# Patient Record
Sex: MALE | Race: WHITE | NOT HISPANIC OR LATINO | Employment: FULL TIME | URBAN - METROPOLITAN AREA
[De-identification: names, ages, dates, MRNs, and addresses within clinical notes are randomized per-mention and may not be internally consistent; named-entity substitution may affect disease eponyms.]

---

## 2023-03-24 DIAGNOSIS — E11.9 TYPE 2 DIABETES MELLITUS WITHOUT COMPLICATIONS (MULTI): ICD-10-CM

## 2023-03-24 RX ORDER — EMPAGLIFLOZIN AND METFORMIN HYDROCHLORIDE 12.5; 5 MG/1; MG/1
TABLET ORAL
Qty: 180 TABLET | Refills: 0 | Status: SHIPPED | OUTPATIENT
Start: 2023-03-24 | End: 2023-07-21

## 2023-07-18 DIAGNOSIS — E11.9 TYPE 2 DIABETES MELLITUS WITHOUT COMPLICATIONS (MULTI): ICD-10-CM

## 2023-07-21 RX ORDER — EMPAGLIFLOZIN AND METFORMIN HYDROCHLORIDE 12.5; 5 MG/1; MG/1
1 TABLET ORAL 2 TIMES DAILY
Qty: 60 TABLET | Refills: 0 | Status: SHIPPED | OUTPATIENT
Start: 2023-07-21 | End: 2023-07-26 | Stop reason: SDUPTHER

## 2023-07-21 NOTE — TELEPHONE ENCOUNTER
Pt made an appointment for 7/26/23  Requesting a short supply for Synjardy 12.5-500mg sent to [facility]

## 2023-07-26 ENCOUNTER — OFFICE VISIT (OUTPATIENT)
Dept: PRIMARY CARE | Facility: CLINIC | Age: 53
End: 2023-07-26
Payer: COMMERCIAL

## 2023-07-26 VITALS
HEART RATE: 81 BPM | HEIGHT: 69 IN | SYSTOLIC BLOOD PRESSURE: 114 MMHG | DIASTOLIC BLOOD PRESSURE: 76 MMHG | OXYGEN SATURATION: 95 % | BODY MASS INDEX: 34.96 KG/M2 | TEMPERATURE: 97 F | RESPIRATION RATE: 16 BRPM | WEIGHT: 236 LBS

## 2023-07-26 DIAGNOSIS — Z00.00 ANNUAL PHYSICAL EXAM: Primary | ICD-10-CM

## 2023-07-26 DIAGNOSIS — E66.09 CLASS 1 OBESITY DUE TO EXCESS CALORIES WITHOUT SERIOUS COMORBIDITY WITH BODY MASS INDEX (BMI) OF 34.0 TO 34.9 IN ADULT: ICD-10-CM

## 2023-07-26 DIAGNOSIS — Z12.5 SCREENING PSA (PROSTATE SPECIFIC ANTIGEN): ICD-10-CM

## 2023-07-26 DIAGNOSIS — Z12.11 COLON CANCER SCREENING: ICD-10-CM

## 2023-07-26 DIAGNOSIS — E11.9 TYPE 2 DIABETES MELLITUS WITHOUT COMPLICATIONS (MULTI): ICD-10-CM

## 2023-07-26 DIAGNOSIS — E11.9 TYPE 2 DIABETES MELLITUS WITHOUT COMPLICATION, WITHOUT LONG-TERM CURRENT USE OF INSULIN (MULTI): ICD-10-CM

## 2023-07-26 DIAGNOSIS — E78.5 DYSLIPIDEMIA: ICD-10-CM

## 2023-07-26 PROBLEM — M70.70 BURSITIS OF HIP: Status: ACTIVE | Noted: 2023-07-26

## 2023-07-26 PROBLEM — R52 BODY ACHES: Status: ACTIVE | Noted: 2023-07-26

## 2023-07-26 PROBLEM — R51.9 HEADACHE: Status: ACTIVE | Noted: 2023-07-26

## 2023-07-26 PROBLEM — M76.60 ACHILLES TENDON PAIN: Status: ACTIVE | Noted: 2023-07-26

## 2023-07-26 PROBLEM — R09.89 CHEST CONGESTION: Status: ACTIVE | Noted: 2023-07-26

## 2023-07-26 PROBLEM — K57.32 DIVERTICULITIS, COLON: Status: ACTIVE | Noted: 2023-07-26

## 2023-07-26 PROBLEM — R53.83 FATIGUE: Status: ACTIVE | Noted: 2023-07-26

## 2023-07-26 PROBLEM — N52.9 ERECTILE DYSFUNCTION: Status: ACTIVE | Noted: 2023-07-26

## 2023-07-26 PROBLEM — M25.552 HIP PAIN, LEFT: Status: ACTIVE | Noted: 2023-07-26

## 2023-07-26 PROBLEM — R19.7 DIARRHEA: Status: ACTIVE | Noted: 2023-07-26

## 2023-07-26 PROBLEM — E66.811 CLASS 1 OBESITY DUE TO EXCESS CALORIES WITHOUT SERIOUS COMORBIDITY WITH BODY MASS INDEX (BMI) OF 34.0 TO 34.9 IN ADULT: Status: ACTIVE | Noted: 2023-07-26

## 2023-07-26 PROCEDURE — 3074F SYST BP LT 130 MM HG: CPT | Performed by: FAMILY MEDICINE

## 2023-07-26 PROCEDURE — 99396 PREV VISIT EST AGE 40-64: CPT | Performed by: FAMILY MEDICINE

## 2023-07-26 PROCEDURE — 3078F DIAST BP <80 MM HG: CPT | Performed by: FAMILY MEDICINE

## 2023-07-26 PROCEDURE — 3008F BODY MASS INDEX DOCD: CPT | Performed by: FAMILY MEDICINE

## 2023-07-26 PROCEDURE — 1036F TOBACCO NON-USER: CPT | Performed by: FAMILY MEDICINE

## 2023-07-26 RX ORDER — MULTIVITAMIN
1 TABLET ORAL DAILY
COMMUNITY

## 2023-07-26 RX ORDER — ATORVASTATIN CALCIUM 20 MG/1
20 TABLET, FILM COATED ORAL DAILY
Qty: 90 TABLET | Refills: 1 | Status: SHIPPED | OUTPATIENT
Start: 2023-07-26 | End: 2023-12-13

## 2023-07-26 RX ORDER — ASPIRIN 81 MG/1
1 TABLET ORAL DAILY
COMMUNITY
Start: 2020-02-19

## 2023-07-26 RX ORDER — DULAGLUTIDE 0.75 MG/.5ML
0.75 INJECTION, SOLUTION SUBCUTANEOUS
Qty: 2 ML | Refills: 1 | Status: SHIPPED | OUTPATIENT
Start: 2023-07-26 | End: 2023-08-18 | Stop reason: SDUPTHER

## 2023-07-26 RX ORDER — FLASH GLUCOSE SCANNING READER
EACH MISCELLANEOUS
Qty: 1 EACH | Refills: 0 | Status: SHIPPED | OUTPATIENT
Start: 2023-07-26 | End: 2023-08-21 | Stop reason: SDUPTHER

## 2023-07-26 RX ORDER — SILDENAFIL 50 MG/1
TABLET, FILM COATED ORAL
COMMUNITY
Start: 2020-09-03

## 2023-07-26 RX ORDER — FLASH GLUCOSE SENSOR
KIT MISCELLANEOUS
Qty: 1 EACH | Refills: 0 | Status: SHIPPED | OUTPATIENT
Start: 2023-07-26 | End: 2023-08-10

## 2023-07-26 RX ORDER — EMPAGLIFLOZIN AND METFORMIN HYDROCHLORIDE 12.5; 5 MG/1; MG/1
1 TABLET ORAL 2 TIMES DAILY
Qty: 180 TABLET | Refills: 1 | Status: SHIPPED | OUTPATIENT
Start: 2023-07-26 | End: 2024-02-19

## 2023-07-26 ASSESSMENT — ENCOUNTER SYMPTOMS
VISUAL CHANGE: 0
SPEECH DIFFICULTY: 0
TREMORS: 0
CONFUSION: 0
HUNGER: 0
BLACKOUTS: 0
POLYPHAGIA: 0
NERVOUS/ANXIOUS: 0
WEIGHT LOSS: 0
WEAKNESS: 0
POLYDIPSIA: 0
DIZZINESS: 0
HEADACHES: 0
BLURRED VISION: 0
FATIGUE: 0
SEIZURES: 0
SWEATS: 1

## 2023-07-26 ASSESSMENT — PATIENT HEALTH QUESTIONNAIRE - PHQ9
2. FEELING DOWN, DEPRESSED OR HOPELESS: NOT AT ALL
1. LITTLE INTEREST OR PLEASURE IN DOING THINGS: NOT AT ALL
SUM OF ALL RESPONSES TO PHQ9 QUESTIONS 1 AND 2: 0

## 2023-07-26 NOTE — PROGRESS NOTES
Subjective   Patient ID: Sergo Cordero is a 52 y.o. male who presents for Annual exam and DM    Diabetes  He has type 2 diabetes mellitus. No MedicAlert identification noted. The initial diagnosis of diabetes was made 4 years ago. Hypoglycemia symptoms include sweats. Pertinent negatives for hypoglycemia include no confusion, dizziness, headaches, hunger, mood changes, nervousness/anxiousness, pallor, seizures, sleepiness, speech difficulty or tremors. Pertinent negatives for diabetes include no blurred vision, no chest pain, no fatigue, no foot paresthesias, no foot ulcerations, no polydipsia, no polyphagia, no polyuria, no visual change, no weakness and no weight loss. Pertinent negatives for hypoglycemia complications include no blackouts, no hospitalization, no nocturnal hypoglycemia, no required assistance and no required glucagon injection. Symptoms are worsening. Pertinent negatives for diabetic complications include no CVA, heart disease, impotence, nephropathy, peripheral neuropathy, PVD or retinopathy. Risk factors for coronary artery disease include dyslipidemia, family history and obesity. Current diabetic treatment includes oral agent (monotherapy). He is compliant with treatment some of the time. He has not had a previous visit with a dietitian. Blood glucose monitoring compliance is inadequate. His home blood glucose trend is fluctuating minimally. His breakfast blood glucose is taken between 6-7 am. His breakfast blood glucose range is generally 180-200 mg/dl. His lunch blood glucose is taken between 12-1 pm. His lunch blood glucose range is generally 180-200 mg/dl. His dinner blood glucose is taken between 7-8 pm. His dinner blood glucose range is generally 180-200 mg/dl. He does not see a podiatrist.Eye exam is current.       Patient presents today for annual exam.  Denies SOB or chest pain.  Eats healthy diet.  Staying active  Sleeping well.  Denies abdominal pain, black or bloody stools.  BM  "normal.  Urination normal, no pain.  Last eye exam was 2/23  Last dental exam was 5 years  No new family h/o cancers or heart disease   Is not fasting for BW.    DM  Does not check glucose at home   Today glucose was did not check his glucose today  Eats a generally healthy diet  Staying active   Currently taking Synjardy  Denies any hypoglycemic symptoms  Last eye exam 2/23  Does not see a Podiatrist  Last A1C was 8.2% on 2/7/22    No other concern    Review of systems  ; Patient seen today for exam denies any problems with headaches or vision, denies any shortness of breath chest pain nausea or vomiting, no black stool no blood in the stool no heartburn type symptoms denies any problems with constipation or diarrhea, and no dysuria-type symptoms    The patient's allergies medications were reviewed with them today    The patient's social family and surgical history or also reviewed here today, along with her past medical history.     Objective     Alert and active in  no acute distress  HEENT TMs clear oropharynx negative nares clear no drainage noted neck supple  With no adenopathy   Heart regular rate and rhythm without murmur and no carotid bruits  Lungs- clear to auscultation bilaterally, no wheeze or rhonchi noted  Thyroid -negative masses or nodularity  Abdomen- soft times four quadrants , bowel sounds positive no masses or organomegaly, negative tenderness guarding or rebound  Neurological exam unremarkable- DTRs in upper and lower extremities within normal limits.   skin -no lesions noted      /76 (BP Location: Right arm, Patient Position: Sitting, BP Cuff Size: Adult)   Pulse 81   Temp 36.1 °C (97 °F) (Temporal)   Resp 16   Ht 1.753 m (5' 9\")   Wt 107 kg (236 lb)   SpO2 95%   BMI 34.85 kg/m²     No Known Allergies    Assessment/Plan   Problem List Items Addressed This Visit       Diabetes (CMS/[facility])    Relevant Medications    atorvastatin (Lipitor) 20 mg tablet    Dyslipidemia    Relevant " Medications    atorvastatin (Lipitor) 20 mg tablet    BMI 34.0-34.9,adult    Class 1 obesity due to excess calories without serious comorbidity with body mass index (BMI) of 34.0 to 34.9 in adult     Other Visit Diagnoses       Annual physical exam        Screening PSA (prostate specific antigen)        Type 2 diabetes mellitus without complications (CMS/HCC)        Relevant Medications    empagliflozin-metformin (Synjardy) 12.5-500 mg    dulaglutide (Trulicity) 0.75 mg/0.5 mL pen injector    FreeStyle Latia sensor system (FreeStyle Latia 2 Sensor) kit    FreeStyle Latia reader (FreeStyle Latia 2 Protivin) misc    Colon cancer screening        Relevant Orders    Colonoscopy          Discussed patient's BMI and to institute calorie reduction and increase exercise to decrease risk of diabetes and heart disease in the future.    Refill Synjardy.    Colonoscopy ordered.    Reviewed labs from June.  A1C was 8.9%  His sugars are worsening.    Discussed medications for diabetes management.  Trulicity 0.75 mg prescribed today.  If he is doing well with this medication, we can increase to next dose in 1 month.  Demonstrated today.    Freestyle Latia and sensors prescribed today.    Form completed for him today.    If anything worsens or changes please call us at once, follow up in the office as planned.    Scribe Attestation  By signing my name below, I, Lola Constantino MA, Scribe   attest that this documentation has been prepared under the direction and in the presence of Jesús Randhawa DO.

## 2023-08-09 ENCOUNTER — TELEPHONE (OUTPATIENT)
Dept: PRIMARY CARE | Facility: CLINIC | Age: 53
End: 2023-08-09
Payer: COMMERCIAL

## 2023-08-09 NOTE — TELEPHONE ENCOUNTER
----- Message from Sergo Cordero sent at 8/9/2023  1:57 PM EDT -----  Regarding: Your Recent Visit  Contact: [phone]  Just giving you feedback on the Trulicity.  My body seems to handle it fine.  No major side effects,  no major nausea.  Seems to work. Glucose has been in the low 100s (109-119) for the past two weeks. I'll  send an update at the month [facility].

## 2023-08-10 DIAGNOSIS — E11.9 TYPE 2 DIABETES MELLITUS WITHOUT COMPLICATIONS (MULTI): ICD-10-CM

## 2023-08-10 RX ORDER — FLASH GLUCOSE SENSOR
KIT MISCELLANEOUS
Qty: 1 EACH | Refills: 0 | Status: SHIPPED | OUTPATIENT
Start: 2023-08-10 | End: 2023-08-24 | Stop reason: SDUPTHER

## 2023-08-18 DIAGNOSIS — E11.9 TYPE 2 DIABETES MELLITUS WITHOUT COMPLICATIONS (MULTI): ICD-10-CM

## 2023-08-18 RX ORDER — DULAGLUTIDE 0.75 MG/.5ML
0.75 INJECTION, SOLUTION SUBCUTANEOUS
Qty: 2 ML | Refills: 1 | Status: SHIPPED | OUTPATIENT
Start: 2023-08-18 | End: 2023-10-02 | Stop reason: DRUGHIGH

## 2023-08-18 NOTE — TELEPHONE ENCOUNTER
----- Message from Sergo Cordero sent at 8/18/2023  7:30 AM EDT -----  Regarding: Trulicity  Contact: [phone]  Raimundo Randhawa.  Used my last dose of trulicity this morning.  It appears that a change in diet and the medicine is working. I attached two graphs from my monitor that show my glucose levels have been fairly good.

## 2023-08-21 DIAGNOSIS — E11.9 TYPE 2 DIABETES MELLITUS WITHOUT COMPLICATIONS (MULTI): ICD-10-CM

## 2023-08-21 RX ORDER — FLASH GLUCOSE SCANNING READER
EACH MISCELLANEOUS
Qty: 1 EACH | Refills: 5 | Status: SHIPPED | OUTPATIENT
Start: 2023-08-21 | End: 2023-08-24 | Stop reason: SDUPTHER

## 2023-08-21 NOTE — TELEPHONE ENCOUNTER
----- Message from Sergo Cordero sent at 8/21/2023 12:43 PM EDT -----  Regarding: [place]  Contact: [phone]  Hello, my freestyle sensor expires this week and I do not have a replacement.  Could you please submit a prescription to [facility] in [place]?  Is it possible to put refills on the prescription? The last two prescriptions had no refills available.

## 2023-08-24 DIAGNOSIS — E11.9 TYPE 2 DIABETES MELLITUS WITHOUT COMPLICATIONS (MULTI): ICD-10-CM

## 2023-08-24 RX ORDER — FLASH GLUCOSE SENSOR
KIT MISCELLANEOUS
Qty: 2 EACH | Refills: 2 | Status: SHIPPED | OUTPATIENT
Start: 2023-08-24 | End: 2023-11-14

## 2023-08-24 NOTE — TELEPHONE ENCOUNTER
Pt is calling because you sent over his Free Style reader but he needed the Free Style sensor kit sent in    Rx Refill Request Telephone Encounter    Name:  Sergo Cordero  : 1970     Medication Name:  FreeStyle Latia 2 sensor kit  Dose (Optional):      Quantity (Optional):    1 each  Directions (Optional):   Use as instructed    ALLERGIES:   nkda    Specific Pharmacy location:  [facility]    Date of last appointment:  23  Date of next appointment:  none    Best number to reach patient:  [phone]

## 2023-09-29 PROBLEM — E11.9 CONTROLLED TYPE 2 DIABETES MELLITUS WITHOUT COMPLICATION, WITHOUT LONG-TERM CURRENT USE OF INSULIN (MULTI): Status: ACTIVE | Noted: 2023-09-29

## 2023-09-29 NOTE — PROGRESS NOTES
Subjective   Patient ID: Franko Cordero is a 52 y.o. male who presents for Diabetes.  Diabetes  He has type 2 diabetes mellitus. No MedicAlert identification noted. The initial diagnosis of diabetes was made 5 years ago. Pertinent negatives for hypoglycemia include no confusion, dizziness, headaches, hunger, mood changes, nervousness/anxiousness, pallor, seizures, sleepiness, speech difficulty, sweats or tremors. Pertinent negatives for diabetes include no blurred vision, no chest pain, no fatigue, no foot paresthesias, no foot ulcerations, no polydipsia, no polyphagia, no polyuria, no visual change, no weakness and no weight loss. Pertinent negatives for hypoglycemia complications include no blackouts, no hospitalization, no nocturnal hypoglycemia, no required assistance and no required glucagon injection. Symptoms are improving. Pertinent negatives for diabetic complications include no CVA, heart disease, impotence, nephropathy, peripheral neuropathy, PVD or retinopathy. Risk factors for coronary artery disease include dyslipidemia, family history and obesity. Current diabetic treatment includes oral agent (dual therapy). He is compliant with treatment most of the time. He is currently taking insulin pre-breakfast. Insulin injections are given by patient. Rotation sites for injection include the abdominal wall. His weight is decreasing steadily. He is following a generally healthy diet. Meal planning includes avoidance of concentrated sweets and carbohydrate counting. He has not had a previous visit with a dietitian. He participates in exercise intermittently. He monitors blood glucose at home 3-4 x per day. He monitors urine at home <1 x per month. Blood glucose monitoring compliance is excellent. There is no change in his home blood glucose trend. His overall blood glucose range is 140-180 mg/dl. He does not see a podiatrist.Eye exam is current.       DM  Does check glucose at home   Today glucose was  "149  Eats a generally healthy diet  Staying active  Currently taking   Last A1c on 6/28/23 @8.9%  Last eye exam 2 /23  Does not see a Podiatrist  Denies any numbness or tingling in feet.    Pt is getting flu vaccine     No other concern    Review of systems  ; Patient seen today for exam denies any problems with headaches or vision, denies any shortness of breath chest pain nausea or vomiting, no black stool no blood in the stool no heartburn type symptoms denies any problems with constipation or diarrhea, and no dysuria-type symptoms    The patient's allergies medications were reviewed with them today    The patient's social family and surgical history or also reviewed here today, along with her past medical history.     Objective     Alert and active in  no acute distress  HEENT TMs clear oropharynx negative nares clear no drainage noted neck supple  With no adenopathy   Heart regular rate and rhythm without murmur and no carotid bruits  Lungs- clear to auscultation bilaterally, no wheeze or rhonchi noted  Thyroid -negative masses or nodularity  Abdomen- soft times four quadrants , bowel sounds positive no masses or organomegaly, negative tenderness guarding or rebound  Neurological exam unremarkable- DTRs in upper and lower extremities within normal limits.   skin -no lesions noted      /68 (BP Location: Left arm, Patient Position: Sitting, BP Cuff Size: Large adult)   Pulse 73   Temp 36.4 °C (97.6 °F) (Temporal)   Resp 16   Ht 1.753 m (5' 9\")   Wt 104 kg (229 lb)   SpO2 99%   BMI 33.82 kg/m²     No Known Allergies    Assessment/Plan   Problem List Items Addressed This Visit       Controlled type 2 diabetes mellitus without complication, without long-term current use of insulin (CMS/[facility])    Relevant Orders    POCT glycosylated hemoglobin (Hb A1C) manually resulted (Completed)     Other Visit Diagnoses       Flu vaccine need        Relevant Orders    Flu vaccine (IIV4) age 6 months and greater, " preservative free (Completed)          A1C performed today, 6.3%.    Increase Trulicity to 1.5 mg.  When he starts the increased dose of Trulicity, he should wait a month and then decrease Synjardy to once daily.    Take baby Aspirin with food.    Follow up in 6 months or sooner if necessary.    If anything worsens or changes please call us at once, follow up in the office as planned.    Scribe Attestation  By signing my name below, I, Lola Constantino MA, Scribe   attest that this documentation has been prepared under the direction and in the presence of Jesús Randhawa DO.

## 2023-10-02 ENCOUNTER — OFFICE VISIT (OUTPATIENT)
Dept: PRIMARY CARE | Facility: CLINIC | Age: 53
End: 2023-10-02
Payer: COMMERCIAL

## 2023-10-02 VITALS
WEIGHT: 229 LBS | HEIGHT: 69 IN | DIASTOLIC BLOOD PRESSURE: 68 MMHG | SYSTOLIC BLOOD PRESSURE: 112 MMHG | RESPIRATION RATE: 16 BRPM | HEART RATE: 73 BPM | TEMPERATURE: 97.6 F | OXYGEN SATURATION: 99 % | BODY MASS INDEX: 33.92 KG/M2

## 2023-10-02 DIAGNOSIS — E11.9 CONTROLLED TYPE 2 DIABETES MELLITUS WITHOUT COMPLICATION, WITHOUT LONG-TERM CURRENT USE OF INSULIN (MULTI): ICD-10-CM

## 2023-10-02 DIAGNOSIS — Z23 FLU VACCINE NEED: ICD-10-CM

## 2023-10-02 LAB — POC HEMOGLOBIN A1C: 6.3 % (ref 4.2–6.5)

## 2023-10-02 PROCEDURE — 99213 OFFICE O/P EST LOW 20 MIN: CPT | Performed by: FAMILY MEDICINE

## 2023-10-02 PROCEDURE — 3008F BODY MASS INDEX DOCD: CPT | Performed by: FAMILY MEDICINE

## 2023-10-02 PROCEDURE — 1036F TOBACCO NON-USER: CPT | Performed by: FAMILY MEDICINE

## 2023-10-02 PROCEDURE — 90471 IMMUNIZATION ADMIN: CPT | Performed by: FAMILY MEDICINE

## 2023-10-02 PROCEDURE — 3074F SYST BP LT 130 MM HG: CPT | Performed by: FAMILY MEDICINE

## 2023-10-02 PROCEDURE — 90686 IIV4 VACC NO PRSV 0.5 ML IM: CPT | Performed by: FAMILY MEDICINE

## 2023-10-02 PROCEDURE — 83036 HEMOGLOBIN GLYCOSYLATED A1C: CPT | Performed by: FAMILY MEDICINE

## 2023-10-02 PROCEDURE — 3078F DIAST BP <80 MM HG: CPT | Performed by: FAMILY MEDICINE

## 2023-10-02 RX ORDER — DULAGLUTIDE 1.5 MG/.5ML
1.5 INJECTION, SOLUTION SUBCUTANEOUS
Qty: 2 ML | Refills: 5 | Status: SHIPPED | OUTPATIENT
Start: 2023-10-02

## 2023-10-02 ASSESSMENT — ENCOUNTER SYMPTOMS
NERVOUS/ANXIOUS: 0
HEADACHES: 0
DIZZINESS: 0
WEAKNESS: 0
BLURRED VISION: 0
SWEATS: 0
TREMORS: 0
BLACKOUTS: 0
HUNGER: 0
WEIGHT LOSS: 0
SEIZURES: 0
POLYPHAGIA: 0
VISUAL CHANGE: 0
POLYDIPSIA: 0
CONFUSION: 0
FATIGUE: 0
SPEECH DIFFICULTY: 0

## 2023-12-10 DIAGNOSIS — E78.5 DYSLIPIDEMIA: ICD-10-CM

## 2023-12-10 DIAGNOSIS — E11.9 TYPE 2 DIABETES MELLITUS WITHOUT COMPLICATION, WITHOUT LONG-TERM CURRENT USE OF INSULIN (MULTI): ICD-10-CM

## 2023-12-11 DIAGNOSIS — E11.9 TYPE 2 DIABETES MELLITUS WITHOUT COMPLICATION, WITHOUT LONG-TERM CURRENT USE OF INSULIN (MULTI): ICD-10-CM

## 2023-12-11 RX ORDER — FLASH GLUCOSE SENSOR
KIT MISCELLANEOUS
Qty: 1 EACH | Refills: 2 | Status: SHIPPED | OUTPATIENT
Start: 2023-12-11 | End: 2024-01-08 | Stop reason: SDUPTHER

## 2023-12-11 NOTE — TELEPHONE ENCOUNTER
Rx Refill Request Telephone Encounter    Name:  Sergo Cordero  :  [id number]  Medication Name:  Freestyle Latia 2 sensor misc  Directions : 1 each every 14 days  Specific Pharmacy location:  [facility] [place]  Date of last appointment:  10/02/2023  Date of next appointment:  none  Best number to reach patient:  [phone]

## 2023-12-13 RX ORDER — ATORVASTATIN CALCIUM 20 MG/1
20 TABLET, FILM COATED ORAL DAILY
Qty: 90 TABLET | Refills: 0 | Status: SHIPPED | OUTPATIENT
Start: 2023-12-13 | End: 2024-04-01

## 2023-12-15 DIAGNOSIS — E11.9 CONTROLLED TYPE 2 DIABETES MELLITUS WITHOUT COMPLICATION, WITHOUT LONG-TERM CURRENT USE OF INSULIN (MULTI): ICD-10-CM

## 2023-12-15 NOTE — TELEPHONE ENCOUNTER
Ry Henriquez,   We can move forward with the 3.0.  I have not had any side effects with the .75 or 1.5.  Thank you.    Trulicity 3.0 pended

## 2023-12-15 NOTE — TELEPHONE ENCOUNTER
----- Message from Sergo Cordero sent at 12/15/2023 11:23 AM EST -----  Regarding: Trulicity on backorder   Contact: [phone]  I have been waiting for my Trulicity to come in at [facility] for two weeks and it still hasn't come in.  I have also checked with other pharmacies and they are back order as well.  What am I supposed to do.  This medicine has been great when it comes to controlling my glucose levels.

## 2024-01-08 DIAGNOSIS — E11.9 TYPE 2 DIABETES MELLITUS WITHOUT COMPLICATION, WITHOUT LONG-TERM CURRENT USE OF INSULIN (MULTI): ICD-10-CM

## 2024-01-08 RX ORDER — FLASH GLUCOSE SENSOR
KIT MISCELLANEOUS
Qty: 1 EACH | Refills: 5 | Status: SHIPPED | OUTPATIENT
Start: 2024-01-08

## 2024-01-08 NOTE — TELEPHONE ENCOUNTER
----- Message from Sergo Cordero sent at 1/8/2024 10:18 AM EST -----  Regarding: Freestyle sensor  Contact: [phone]  Raimundo, could you please send a prescription for my freestyle 2 sensor to the [facility] on [address] in [place]?   I have no renewals.

## 2024-02-02 DIAGNOSIS — E11.9 CONTROLLED TYPE 2 DIABETES MELLITUS WITHOUT COMPLICATION, WITHOUT LONG-TERM CURRENT USE OF INSULIN (MULTI): ICD-10-CM

## 2024-02-02 RX ORDER — DULAGLUTIDE 3 MG/.5ML
3 INJECTION, SOLUTION SUBCUTANEOUS
Qty: 2 ML | Refills: 0 | Status: SHIPPED | OUTPATIENT
Start: 2024-02-02 | End: 2024-03-07

## 2024-02-19 DIAGNOSIS — E11.9 TYPE 2 DIABETES MELLITUS WITHOUT COMPLICATIONS (MULTI): ICD-10-CM

## 2024-02-19 RX ORDER — EMPAGLIFLOZIN AND METFORMIN HYDROCHLORIDE 12.5; 5 MG/1; MG/1
1 TABLET ORAL 2 TIMES DAILY
Qty: 180 TABLET | Refills: 0 | Status: SHIPPED | OUTPATIENT
Start: 2024-02-19 | End: 2024-04-10 | Stop reason: SDUPTHER

## 2024-02-21 ENCOUNTER — APPOINTMENT (OUTPATIENT)
Dept: PRIMARY CARE | Facility: CLINIC | Age: 54
End: 2024-02-21
Payer: COMMERCIAL

## 2024-03-07 DIAGNOSIS — E11.9 CONTROLLED TYPE 2 DIABETES MELLITUS WITHOUT COMPLICATION, WITHOUT LONG-TERM CURRENT USE OF INSULIN (MULTI): ICD-10-CM

## 2024-03-07 RX ORDER — DULAGLUTIDE 3 MG/.5ML
3 INJECTION, SOLUTION SUBCUTANEOUS
Qty: 5 EACH | Refills: 2 | Status: SHIPPED | OUTPATIENT
Start: 2024-03-07 | End: 2024-04-10 | Stop reason: SDUPTHER

## 2024-04-01 DIAGNOSIS — E78.5 DYSLIPIDEMIA: ICD-10-CM

## 2024-04-01 DIAGNOSIS — E11.9 TYPE 2 DIABETES MELLITUS WITHOUT COMPLICATION, WITHOUT LONG-TERM CURRENT USE OF INSULIN (MULTI): ICD-10-CM

## 2024-04-01 DIAGNOSIS — E11.9 TYPE 2 DIABETES MELLITUS WITHOUT COMPLICATIONS (MULTI): ICD-10-CM

## 2024-04-01 RX ORDER — BLOOD-GLUCOSE SENSOR
EACH MISCELLANEOUS
Qty: 3 EACH | Refills: 0 | Status: SHIPPED | OUTPATIENT
Start: 2024-04-01 | End: 2024-05-05 | Stop reason: SDUPTHER

## 2024-04-01 RX ORDER — BLOOD-GLUCOSE,RECEIVER,CONT
EACH MISCELLANEOUS
Qty: 1 EACH | Refills: 0 | Status: SHIPPED | OUTPATIENT
Start: 2024-04-01 | End: 2024-04-30 | Stop reason: SDUPTHER

## 2024-04-01 RX ORDER — FLASH GLUCOSE SENSOR
KIT MISCELLANEOUS
Qty: 1 EACH | Refills: 2 | Status: SHIPPED | OUTPATIENT
Start: 2024-04-01 | End: 2024-04-01

## 2024-04-01 RX ORDER — ATORVASTATIN CALCIUM 20 MG/1
20 TABLET, FILM COATED ORAL DAILY
Qty: 90 TABLET | Refills: 0 | Status: SHIPPED | OUTPATIENT
Start: 2024-04-01 | End: 2024-04-10 | Stop reason: SDUPTHER

## 2024-04-01 NOTE — TELEPHONE ENCOUNTER
Spoke to patient. He is going to call his insurance to see what they cover, and then call us back. Please refuse.

## 2024-04-01 NOTE — TELEPHONE ENCOUNTER
----- Message from Sergo Cordero sent at 4/1/2024 10:48 AM EDT -----  Regarding: Freestyle andrew 2 refill  Contact: [phone]  Hello.  My freestyle andrew 2 sensor ends tomorrow.  Can you have Dr Sepulveda call it in or possibly the freestyle andrew 3 if it is covered.  Thank you.

## 2024-04-09 NOTE — PROGRESS NOTES
"Subjective   Patient ID: Sergo Cordero \"Franko\" is a 53 y.o. male who presents for Hip Pain, Diabetes, and Annual Exam.    HPI    Annual Exam  Denies abdominal pain, black or bloody stools.     DM  Does check glucose at home   Today glucose was 134  Eats a generally healthy diet  Active   Currently taking trulicity, synjardy  Last A1c on 10/2/23 @6.3%  Last eye exam last year  Does not see a Podiatrist     Continued bilateral hip pain   Ongoing for 1 year  No injury  Pain is described as nagging   Rates pain level today as  4 /10  Pain does not radiate  Is taking Motrin and Tylenol -minimal relief  Usually hurts right after sitting.   Pain hurts on the outside of the hip.     Medication were reviewed no refills at this time     No other questions and or concerns for today's visit     Review of systems  ; Patient seen today for exam denies any problems with headaches or vision, denies any shortness of breath chest pain nausea or vomiting, no black stool no blood in the stool no heartburn type symptoms denies any problems with constipation or diarrhea, and no dysuria-type symptoms    The patient's allergies medications were reviewed with them today    The patient's social family and surgical history or also reviewed here today, along with her past medical history.     Objective     Alert and active in  no acute distress  HEENT TMs clear oropharynx negative nares clear no drainage noted neck supple  With no adenopathy   Heart regular rate and rhythm without murmur and no carotid bruits  Lungs- clear to auscultation bilaterally, no wheeze or rhonchi noted  Thyroid -negative masses or nodularity  Abdomen- soft times four quadrants , bowel sounds positive no masses or organomegaly, negative tenderness guarding or rebound  Neurological exam unremarkable- DTRs in upper and lower extremities within normal limits.   skin -no lesions noted    Bilateral hips show mild inflammation at the bursal sac no pain in the groin full " "range of motion      /72 (BP Location: Left arm, Patient Position: Sitting, BP Cuff Size: Large adult)   Pulse 85   Temp 36.2 °C (97.1 °F) (Temporal)   Resp 16   Ht 1.753 m (5' 9\")   Wt 103 kg (226 lb)   SpO2 99%   BMI 33.37 kg/m²     No Known Allergies    Assessment/Plan   Problem List Items Addressed This Visit       Bursitis of hip    Diabetes (CMS/HCC)    Relevant Medications    atorvastatin (Lipitor) 20 mg tablet    Dyslipidemia    Relevant Medications    atorvastatin (Lipitor) 20 mg tablet    Other Relevant Orders    CBC and Auto Differential    Comprehensive Metabolic Panel    Lipid Panel    Erectile dysfunction    Fatigue    Hip pain, left    Controlled type 2 diabetes mellitus without complication, without long-term current use of insulin (CMS/HCC)    Relevant Medications    dulaglutide (Trulicity) 3 mg/0.5 mL pen injector (Start on 4/14/2024)    Other Relevant Orders    Hemoglobin A1c    CBC and Auto Differential    Comprehensive Metabolic Panel    Albumin , Urine Random     Other Visit Diagnoses       Routine general medical examination at a health care facility    -  Primary    BMI 33.0-33.9,adult        Class 1 obesity due to excess calories without serious comorbidity with body mass index (BMI) of 33.0 to 33.9 in adult        Screening PSA (prostate specific antigen)        Relevant Orders    Prostate Specific Antigen, Screen    Screen for colon cancer        Relevant Orders    Cologuard® colon cancer screening    Type 2 diabetes mellitus without complications (CMS/HCC)        Relevant Medications    empagliflozin-metformin (Synjardy) 12.5-500 mg          Discussed patient's BMI and to institute calorie reduction and increase exercise to decrease risk of diabetes and heart disease in the future.    Refill Synjardy, Trulicity, Atorvastatin.     Cologuard ordered.     If hips continue to bother him, we can refer to therapist.     Labs have been ordered, she/he will have these performed and " we will contact her/him with results.  (CBC, CMP, Lipid, A1C, Albumin, PSA)    If anything worsens or changes please call us at once, follow up in the office as planned.    Scribe Attestation  By signing my name below, I, Lola Constantino MA, Scribe   attest that this documentation has been prepared under the direction and in the presence of Jesús Randhawa DO.

## 2024-04-10 ENCOUNTER — LAB (OUTPATIENT)
Dept: LAB | Facility: LAB | Age: 54
End: 2024-04-10
Payer: COMMERCIAL

## 2024-04-10 ENCOUNTER — OFFICE VISIT (OUTPATIENT)
Dept: PRIMARY CARE | Facility: CLINIC | Age: 54
End: 2024-04-10
Payer: COMMERCIAL

## 2024-04-10 VITALS
HEART RATE: 85 BPM | RESPIRATION RATE: 16 BRPM | HEIGHT: 69 IN | DIASTOLIC BLOOD PRESSURE: 72 MMHG | TEMPERATURE: 97.1 F | BODY MASS INDEX: 33.47 KG/M2 | OXYGEN SATURATION: 99 % | WEIGHT: 226 LBS | SYSTOLIC BLOOD PRESSURE: 104 MMHG

## 2024-04-10 DIAGNOSIS — E78.5 DYSLIPIDEMIA: ICD-10-CM

## 2024-04-10 DIAGNOSIS — M70.70 BURSITIS OF HIP, UNSPECIFIED BURSA, UNSPECIFIED LATERALITY: ICD-10-CM

## 2024-04-10 DIAGNOSIS — N52.9 ERECTILE DYSFUNCTION, UNSPECIFIED ERECTILE DYSFUNCTION TYPE: ICD-10-CM

## 2024-04-10 DIAGNOSIS — E11.9 TYPE 2 DIABETES MELLITUS WITHOUT COMPLICATIONS (MULTI): ICD-10-CM

## 2024-04-10 DIAGNOSIS — E11.9 CONTROLLED TYPE 2 DIABETES MELLITUS WITHOUT COMPLICATION, WITHOUT LONG-TERM CURRENT USE OF INSULIN (MULTI): ICD-10-CM

## 2024-04-10 DIAGNOSIS — Z12.11 SCREEN FOR COLON CANCER: ICD-10-CM

## 2024-04-10 DIAGNOSIS — E11.9 TYPE 2 DIABETES MELLITUS WITHOUT COMPLICATION, WITHOUT LONG-TERM CURRENT USE OF INSULIN (MULTI): ICD-10-CM

## 2024-04-10 DIAGNOSIS — Z12.5 SCREENING PSA (PROSTATE SPECIFIC ANTIGEN): ICD-10-CM

## 2024-04-10 DIAGNOSIS — Z00.00 ROUTINE GENERAL MEDICAL EXAMINATION AT A HEALTH CARE FACILITY: Primary | ICD-10-CM

## 2024-04-10 DIAGNOSIS — R53.83 FATIGUE, UNSPECIFIED TYPE: ICD-10-CM

## 2024-04-10 DIAGNOSIS — E66.09 CLASS 1 OBESITY DUE TO EXCESS CALORIES WITHOUT SERIOUS COMORBIDITY WITH BODY MASS INDEX (BMI) OF 33.0 TO 33.9 IN ADULT: ICD-10-CM

## 2024-04-10 DIAGNOSIS — M25.552 HIP PAIN, LEFT: ICD-10-CM

## 2024-04-10 LAB
ALBUMIN SERPL BCP-MCNC: 5.1 G/DL (ref 3.4–5)
ALP SERPL-CCNC: 84 U/L (ref 33–120)
ALT SERPL W P-5'-P-CCNC: 21 U/L (ref 10–52)
ANION GAP SERPL CALC-SCNC: 15 MMOL/L (ref 10–20)
AST SERPL W P-5'-P-CCNC: 18 U/L (ref 9–39)
BASOPHILS # BLD AUTO: 0.08 X10*3/UL (ref 0–0.1)
BASOPHILS NFR BLD AUTO: 1.1 %
BILIRUB SERPL-MCNC: 0.7 MG/DL (ref 0–1.2)
BUN SERPL-MCNC: 20 MG/DL (ref 6–23)
CALCIUM SERPL-MCNC: 10.1 MG/DL (ref 8.6–10.3)
CHLORIDE SERPL-SCNC: 102 MMOL/L (ref 98–107)
CHOLEST SERPL-MCNC: 174 MG/DL (ref 0–199)
CHOLESTEROL/HDL RATIO: 4
CO2 SERPL-SCNC: 28 MMOL/L (ref 21–32)
CREAT SERPL-MCNC: 1.18 MG/DL (ref 0.5–1.3)
CREAT UR-MCNC: 118.3 MG/DL (ref 20–370)
EGFRCR SERPLBLD CKD-EPI 2021: 73 ML/MIN/1.73M*2
EOSINOPHIL # BLD AUTO: 0.13 X10*3/UL (ref 0–0.7)
EOSINOPHIL NFR BLD AUTO: 1.7 %
ERYTHROCYTE [DISTWIDTH] IN BLOOD BY AUTOMATED COUNT: 11.9 % (ref 11.5–14.5)
EST. AVERAGE GLUCOSE BLD GHB EST-MCNC: 143 MG/DL
GLUCOSE SERPL-MCNC: 132 MG/DL (ref 74–99)
HBA1C MFR BLD: 6.6 %
HCT VFR BLD AUTO: 48.3 % (ref 41–52)
HDLC SERPL-MCNC: 43.2 MG/DL
HGB BLD-MCNC: 16.9 G/DL (ref 13.5–17.5)
IMM GRANULOCYTES # BLD AUTO: 0.03 X10*3/UL (ref 0–0.7)
IMM GRANULOCYTES NFR BLD AUTO: 0.4 % (ref 0–0.9)
LDLC SERPL CALC-MCNC: 101 MG/DL
LYMPHOCYTES # BLD AUTO: 2.24 X10*3/UL (ref 1.2–4.8)
LYMPHOCYTES NFR BLD AUTO: 30 %
MCH RBC QN AUTO: 30.3 PG (ref 26–34)
MCHC RBC AUTO-ENTMCNC: 35 G/DL (ref 32–36)
MCV RBC AUTO: 87 FL (ref 80–100)
MICROALBUMIN UR-MCNC: <7 MG/L
MICROALBUMIN/CREAT UR: NORMAL MG/G{CREAT}
MONOCYTES # BLD AUTO: 0.65 X10*3/UL (ref 0.1–1)
MONOCYTES NFR BLD AUTO: 8.7 %
NEUTROPHILS # BLD AUTO: 4.34 X10*3/UL (ref 1.2–7.7)
NEUTROPHILS NFR BLD AUTO: 58.1 %
NON HDL CHOLESTEROL: 131 MG/DL (ref 0–149)
NRBC BLD-RTO: 0 /100 WBCS (ref 0–0)
PLATELET # BLD AUTO: 203 X10*3/UL (ref 150–450)
POTASSIUM SERPL-SCNC: 5.5 MMOL/L (ref 3.5–5.3)
PROT SERPL-MCNC: 7.1 G/DL (ref 6.4–8.2)
PSA SERPL-MCNC: 0.32 NG/ML
RBC # BLD AUTO: 5.58 X10*6/UL (ref 4.5–5.9)
SODIUM SERPL-SCNC: 139 MMOL/L (ref 136–145)
TRIGL SERPL-MCNC: 148 MG/DL (ref 0–149)
VLDL: 30 MG/DL (ref 0–40)
WBC # BLD AUTO: 7.5 X10*3/UL (ref 4.4–11.3)

## 2024-04-10 PROCEDURE — 80061 LIPID PANEL: CPT

## 2024-04-10 PROCEDURE — 84153 ASSAY OF PSA TOTAL: CPT

## 2024-04-10 PROCEDURE — 83036 HEMOGLOBIN GLYCOSYLATED A1C: CPT

## 2024-04-10 PROCEDURE — 3078F DIAST BP <80 MM HG: CPT | Performed by: FAMILY MEDICINE

## 2024-04-10 PROCEDURE — 85025 COMPLETE CBC W/AUTO DIFF WBC: CPT

## 2024-04-10 PROCEDURE — 99396 PREV VISIT EST AGE 40-64: CPT | Performed by: FAMILY MEDICINE

## 2024-04-10 PROCEDURE — 3049F LDL-C 100-129 MG/DL: CPT | Performed by: FAMILY MEDICINE

## 2024-04-10 PROCEDURE — 82570 ASSAY OF URINE CREATININE: CPT

## 2024-04-10 PROCEDURE — 3062F POS MACROALBUMINURIA REV: CPT | Performed by: FAMILY MEDICINE

## 2024-04-10 PROCEDURE — 3074F SYST BP LT 130 MM HG: CPT | Performed by: FAMILY MEDICINE

## 2024-04-10 PROCEDURE — 82043 UR ALBUMIN QUANTITATIVE: CPT

## 2024-04-10 PROCEDURE — 3008F BODY MASS INDEX DOCD: CPT | Performed by: FAMILY MEDICINE

## 2024-04-10 PROCEDURE — 36415 COLL VENOUS BLD VENIPUNCTURE: CPT

## 2024-04-10 PROCEDURE — 1036F TOBACCO NON-USER: CPT | Performed by: FAMILY MEDICINE

## 2024-04-10 PROCEDURE — 80053 COMPREHEN METABOLIC PANEL: CPT

## 2024-04-10 PROCEDURE — 3044F HG A1C LEVEL LT 7.0%: CPT | Performed by: FAMILY MEDICINE

## 2024-04-10 RX ORDER — EMPAGLIFLOZIN AND METFORMIN HYDROCHLORIDE 12.5; 5 MG/1; MG/1
1 TABLET ORAL 2 TIMES DAILY
Qty: 180 TABLET | Refills: 1 | Status: SHIPPED | OUTPATIENT
Start: 2024-04-10

## 2024-04-10 RX ORDER — DULAGLUTIDE 3 MG/.5ML
3 INJECTION, SOLUTION SUBCUTANEOUS
Qty: 6 ML | Refills: 1 | Status: SHIPPED | OUTPATIENT
Start: 2024-04-14

## 2024-04-10 RX ORDER — ATORVASTATIN CALCIUM 20 MG/1
20 TABLET, FILM COATED ORAL DAILY
Qty: 90 TABLET | Refills: 1 | Status: SHIPPED | OUTPATIENT
Start: 2024-04-10

## 2024-04-10 ASSESSMENT — PROMIS GLOBAL HEALTH SCALE
RATE_AVERAGE_PAIN: 4
RATE_PHYSICAL_HEALTH: GOOD
RATE_MENTAL_HEALTH: EXCELLENT
CARRYOUT_SOCIAL_ACTIVITIES: EXCELLENT
RATE_GENERAL_HEALTH: GOOD
RATE_AVERAGE_FATIGUE: MILD
CARRYOUT_PHYSICAL_ACTIVITIES: COMPLETELY
RATE_SOCIAL_SATISFACTION: EXCELLENT
RATE_QUALITY_OF_LIFE: VERY GOOD
EMOTIONAL_PROBLEMS: NEVER

## 2024-04-10 ASSESSMENT — PATIENT HEALTH QUESTIONNAIRE - PHQ9
1. LITTLE INTEREST OR PLEASURE IN DOING THINGS: NOT AT ALL
2. FEELING DOWN, DEPRESSED OR HOPELESS: NOT AT ALL
SUM OF ALL RESPONSES TO PHQ9 QUESTIONS 1 AND 2: 0

## 2024-04-10 NOTE — LETTER
May 28, 2024     Franko Cordero  [address] [address]      Dear Mr. Cordero:    Below are the results from your recent visit:    Resulted Orders   Cologuard® colon cancer screening   Result Value Ref Range    NONINV COLON CA DNA+OCC BLD SCRN STL QL Negative Negative      Comment:        NEGATIVE TEST RESULT. A negative Cologuard result indicates a low likelihood that a colorectal cancer (CRC) or advanced adenoma (adenomatous polyps with more advanced pre-malignant features)  is present. The chance that a person with a negative Cologuard test has a colorectal cancer is less than 1 in 1500 (negative predictive value >99.9%) or has an  advanced adenoma is less than  5.3% (negative predictive value 94.7%). These data are based on a prospective cross-sectional study of 10,000 individuals at average risk for colorectal cancer who were screened with both Cologuard and colonoscopy. (Chelsea GARDINER et al, N Engl J Med 2014;370(14):5141-7980) The normal value (reference range) for this assay is negative.    COLOGUARD RE-SCREENING RECOMMENDATION: Periodic colorectal cancer screening is an important part of preventive healthcare for asymptomatic individuals at average risk for colorectal cancer.  Following a negative Cologuard result, the American Cancer Society and U.S.  Multi-Society Task Force screening guidelines recommend a Cologuard re-screening interval of 3 years.   References: American Cancer Society Guideline for Colorectal Cancer Screening: https://www.cancer.org/cancer/colon-rectal-cancer/pinaggeto-nkfyhllsu-pmgzcuw/acs-recommendations.html.; Jam DK, Pablo PHILLIPS, Misa BEASLEYK, Colorectal Cancer Screening: Recommendations for Physicians and Patients from the U.S. Multi-Society Task Force on Colorectal Cancer Screening , Am J Gastroenterology 2017; 112:6220-8125.    TEST DESCRIPTION: Composite algorithmic analysis of stool DNA-biomarkers with hemoglobin immunoassay.   Quantitative values of  individual biomarkers are not reportable and are not associated with individual biomarker result reference ranges. Cologuard is intended for colorectal cancer screening of adults of either sex, 45 years or older, who are at average-risk for colorectal cancer (CRC). Cologuard has been approved for use by the U.S. FDA. The performance of Cologuard was  established in a cross sectional study of average-risk adults aged 50-84. Cologuard performance in patients ages 45 to 49 years was estimated by sub-group analysis of near-age groups. Colonoscopies performed for a positive result may find as the most clinically significant lesion: colorectal cancer [4.0%], advanced adenoma (including sessile serrated polyps greater than or equal to 1cm diameter) [20%] or non- advanced adenoma [31%]; or no colorectal neoplasia [45%]. These estimates are derived from a prospective cross-sectional screening study of 10,000 individuals at average risk for colorectal cancer who were screened with both Cologuard and colonoscopy. (Chelsea BERRY. et al, N Engl J Med 2014;370(14):6773-9698.) Cologuard may produce a false negative or false positive result (no colorectal cancer or precancerous polyp present at colonoscopy follow up). A negative Cologuard test result does not guarantee the absence of CRC or advanced adenoma (pre-cancer). The current Cologuard  screening interval is every 3 years. (American Cancer Society and U.S. Multi-Society Task Force). Cologuard performance data in a 10,000 patient pivotal study using colonoscopy as the reference method can be accessed at the following location: www.[employer] [place] [employer].Light[employer]/results. Additional description of the Cologuard test process, warnings and precautions can be found at www.cologuard.com.       Cologuard stool test is negative, repeat in 3 years     If you have any questions or concerns, please don't hesitate to call.         Sincerely,        Jesús Randhawa, DO

## 2024-04-30 DIAGNOSIS — E11.9 TYPE 2 DIABETES MELLITUS WITHOUT COMPLICATIONS (MULTI): ICD-10-CM

## 2024-04-30 RX ORDER — BLOOD-GLUCOSE,RECEIVER,CONT
EACH MISCELLANEOUS
Qty: 1 EACH | Refills: 0 | Status: SHIPPED | OUTPATIENT
Start: 2024-04-30

## 2024-05-03 DIAGNOSIS — E11.9 TYPE 2 DIABETES MELLITUS WITHOUT COMPLICATIONS (MULTI): ICD-10-CM

## 2024-05-05 RX ORDER — BLOOD-GLUCOSE SENSOR
EACH MISCELLANEOUS
Qty: 4 EACH | Refills: 2 | Status: SHIPPED | OUTPATIENT
Start: 2024-05-05

## 2024-05-24 LAB — NONINV COLON CA DNA+OCC BLD SCRN STL QL: NEGATIVE

## 2024-05-28 ENCOUNTER — TELEPHONE (OUTPATIENT)
Dept: PRIMARY CARE | Facility: CLINIC | Age: 54
End: 2024-05-28
Payer: COMMERCIAL

## 2024-05-28 NOTE — TELEPHONE ENCOUNTER
[employer] message sent    ----- Message from Jesús Randhawa DO sent at 5/28/2024  7:39 AM EDT -----  Cologuard stool test is negative, repeat in 3 years

## 2024-07-10 ENCOUNTER — APPOINTMENT (OUTPATIENT)
Dept: PRIMARY CARE | Facility: CLINIC | Age: 54
End: 2024-07-10
Payer: COMMERCIAL

## 2024-07-10 VITALS
DIASTOLIC BLOOD PRESSURE: 64 MMHG | WEIGHT: 225.6 LBS | HEIGHT: 69 IN | TEMPERATURE: 97 F | OXYGEN SATURATION: 99 % | BODY MASS INDEX: 33.41 KG/M2 | RESPIRATION RATE: 16 BRPM | HEART RATE: 86 BPM | SYSTOLIC BLOOD PRESSURE: 102 MMHG

## 2024-07-10 DIAGNOSIS — M54.50 CHRONIC LOW BACK PAIN, UNSPECIFIED BACK PAIN LATERALITY, UNSPECIFIED WHETHER SCIATICA PRESENT: ICD-10-CM

## 2024-07-10 DIAGNOSIS — E66.09 CLASS 1 OBESITY DUE TO EXCESS CALORIES WITHOUT SERIOUS COMORBIDITY WITH BODY MASS INDEX (BMI) OF 33.0 TO 33.9 IN ADULT: ICD-10-CM

## 2024-07-10 DIAGNOSIS — E11.9 CONTROLLED TYPE 2 DIABETES MELLITUS WITHOUT COMPLICATION, WITHOUT LONG-TERM CURRENT USE OF INSULIN (MULTI): Primary | ICD-10-CM

## 2024-07-10 DIAGNOSIS — R53.83 FATIGUE, UNSPECIFIED TYPE: ICD-10-CM

## 2024-07-10 DIAGNOSIS — E78.5 DYSLIPIDEMIA: ICD-10-CM

## 2024-07-10 DIAGNOSIS — M70.70 BURSITIS OF HIP, UNSPECIFIED BURSA, UNSPECIFIED LATERALITY: ICD-10-CM

## 2024-07-10 DIAGNOSIS — G89.29 CHRONIC LOW BACK PAIN, UNSPECIFIED BACK PAIN LATERALITY, UNSPECIFIED WHETHER SCIATICA PRESENT: ICD-10-CM

## 2024-07-10 DIAGNOSIS — E13.69 OTHER SPECIFIED DIABETES MELLITUS WITH OTHER SPECIFIED COMPLICATION, UNSPECIFIED WHETHER LONG TERM INSULIN USE (MULTI): ICD-10-CM

## 2024-07-10 PROCEDURE — 3044F HG A1C LEVEL LT 7.0%: CPT | Performed by: FAMILY MEDICINE

## 2024-07-10 PROCEDURE — 3049F LDL-C 100-129 MG/DL: CPT | Performed by: FAMILY MEDICINE

## 2024-07-10 PROCEDURE — 3008F BODY MASS INDEX DOCD: CPT | Performed by: FAMILY MEDICINE

## 2024-07-10 PROCEDURE — 3062F POS MACROALBUMINURIA REV: CPT | Performed by: FAMILY MEDICINE

## 2024-07-10 PROCEDURE — 1036F TOBACCO NON-USER: CPT | Performed by: FAMILY MEDICINE

## 2024-07-10 PROCEDURE — 99214 OFFICE O/P EST MOD 30 MIN: CPT | Performed by: FAMILY MEDICINE

## 2024-07-10 PROCEDURE — 3074F SYST BP LT 130 MM HG: CPT | Performed by: FAMILY MEDICINE

## 2024-07-10 PROCEDURE — 3078F DIAST BP <80 MM HG: CPT | Performed by: FAMILY MEDICINE

## 2024-07-10 RX ORDER — DULAGLUTIDE 3 MG/.5ML
3 INJECTION, SOLUTION SUBCUTANEOUS
Qty: 6 ML | Refills: 1 | Status: SHIPPED | OUTPATIENT
Start: 2024-07-14 | End: 2024-07-12 | Stop reason: SDUPTHER

## 2024-07-10 ASSESSMENT — ENCOUNTER SYMPTOMS
BACK PAIN: 1
ABDOMINAL PAIN: 0
FEVER: 0
WEIGHT LOSS: 0
PERIANAL NUMBNESS: 0
TINGLING: 0
WEAKNESS: 0
PARESTHESIAS: 0
DYSURIA: 0
HEADACHES: 0
LEG PAIN: 1
PARESIS: 0
BOWEL INCONTINENCE: 0
NUMBNESS: 0

## 2024-07-10 ASSESSMENT — PATIENT HEALTH QUESTIONNAIRE - PHQ9
1. LITTLE INTEREST OR PLEASURE IN DOING THINGS: NOT AT ALL
SUM OF ALL RESPONSES TO PHQ9 QUESTIONS 1 AND 2: 0
2. FEELING DOWN, DEPRESSED OR HOPELESS: NOT AT ALL

## 2024-07-10 NOTE — PROGRESS NOTES
"Subjective   Patient ID: Sergo Cordero \"Franko\" is a 53 y.o. male who presents for Sciatica and Back Pain.    Back Pain  This is a recurrent problem. The current episode started more than 1 month ago. The problem has been waxing and waning since onset. The pain is present in the lumbar spine. The quality of the pain is described as aching, cramping and shooting. The pain radiates to the right foot, right knee and right thigh. The pain is at a severity of 4/10. The pain is The same all the time. The symptoms are aggravated by sitting. Stiffness is present All day. Associated symptoms include leg pain. Pertinent negatives include no abdominal pain, bladder incontinence, bowel incontinence, chest pain, dysuria, fever, headaches, numbness, paresis, paresthesias, pelvic pain, perianal numbness, tingling, weakness or weight loss. Risk factors include lack of exercise, obesity and poor posture.   He states in 2005 he injured his back, had bulging L4-L5. He did not have surgery at that time. He did PT and improved. States usually the pain is localized in the lower right quadrant of the back.     Pt want to discuss TRULICITY pt pharmacy is not covering the medication   He has been getting it through mail order.   Pt did bring a list for replacement for the Trulicity      Denies chest pain or SOB. States mom had an enlarged heart. Father had diabetes, but no heart disease. Brother had stents, diabetes and was a smoker.    No other questions and or concerns for today's visit     Review of systems  ; Patient seen today for exam denies any problems with headaches or vision, denies any shortness of breath chest pain nausea or vomiting, no black stool no blood in the stool no heartburn type symptoms denies any problems with constipation or diarrhea, and no dysuria-type symptoms    The patient's allergies medications were reviewed with them today    The patient's social family and surgical history or also reviewed here today, " "along with her past medical history.     Objective     Alert and active in  no acute distress  HEENT TMs clear oropharynx negative nares clear no drainage noted neck supple  With no adenopathy   Heart regular rate and rhythm without murmur and no carotid bruits  Lungs- clear to auscultation bilaterally, no wheeze or rhonchi noted  Thyroid -negative masses or nodularity  Abdomen- soft times four quadrants , bowel sounds positive no masses or organomegaly, negative tenderness guarding or rebound  Neurological exam unremarkable- DTRs in upper and lower extremities within normal limits.   skin -no lesions noted    Right sciatic notch tenderness noted      /64 (BP Location: Left arm, Patient Position: Sitting, BP Cuff Size: Large adult)   Pulse 86   Temp 36.1 °C (97 °F) (Temporal)   Resp 16   Ht 1.753 m (5' 9\")   Wt 102 kg (225 lb 9.6 oz)   SpO2 99%   BMI 33.32 kg/m²     No Known Allergies    Assessment/Plan   Problem List Items Addressed This Visit       Bursitis of hip    Diabetes (Multi)    Dyslipidemia    Fatigue    Controlled type 2 diabetes mellitus without complication, without long-term current use of insulin (Multi)    Relevant Medications    dulaglutide (Trulicity) 3 mg/0.5 mL pen injector (Start on 7/14/2024)     Other Visit Diagnoses       BMI 33.0-33.9,adult        Class 1 obesity due to excess calories without serious comorbidity with body mass index (BMI) of 33.0 to 33.9 in adult        Chronic low back pain, unspecified back pain laterality, unspecified whether sciatica present              Discussed patient's BMI and to institute calorie reduction and increase exercise to decrease risk of diabetes and heart disease in the future.    Refill Trulicity to local pharmacy.   If they do not have 3 mg, we will try to increase to 4.5 mg.     Importance of seeing eye doctor once yearly discussed.     Demonstrated stretches for back.  Knee to chest, knee to opposite shoulder, Pretzel, Hamstring " stretch.  Perform these for 5-10 minutes BID.  Instructions provided.    If he worsens, we will refer to therapist.     Follow up before the holidays.     If anything worsens or changes please call us at once, follow up in the office as planned.    Scribe Attestation  By signing my name below, ILola MA, Scribe   attest that this documentation has been prepared under the direction and in the presence of Jesús Randhawa DO.

## 2024-07-12 DIAGNOSIS — E11.9 CONTROLLED TYPE 2 DIABETES MELLITUS WITHOUT COMPLICATION, WITHOUT LONG-TERM CURRENT USE OF INSULIN (MULTI): ICD-10-CM

## 2024-07-12 PROCEDURE — RXMED WILLOW AMBULATORY MEDICATION CHARGE

## 2024-07-12 RX ORDER — DULAGLUTIDE 3 MG/.5ML
3 INJECTION, SOLUTION SUBCUTANEOUS
Qty: 6 ML | Refills: 1 | Status: SHIPPED | OUTPATIENT
Start: 2024-07-14

## 2024-07-12 NOTE — TELEPHONE ENCOUNTER
"Per MyChart... Please advise.    Sergo Cordero \"Franko\"  [id number] [place] [id number] Clinical Support Staff (supporting Jesús Randhawa DO)14 minutes ago (10:57 AM)     MC  The 3.0 Trulicity is not available at [facility] or through their mail order program [facility].  I have spoken to [facility] and [facility].  They have offered Ozempic as a replacement.  I am not sure of the difference in the GLP-1 drugs other than ozempic is semaglutide and the trulicity is dulaglutide.  Please let me know how I should proceed.     Thank you,  Franko  "

## 2024-07-12 NOTE — TELEPHONE ENCOUNTER
"90 day supply pending for [facility]. Patient is aware.    Sergo Cordero \"Franko\"  [id number] [place] [id number] Clinical Support Staff (supporting You)7 minutes ago (11:53 AM)     LUIS Arce,  Both pharmacies are in network.  For a 90 day supply both require a prior authorization.  I would prefer [facility] since they can have it delivered to my house.     Thank you,  Franko  "

## 2024-07-12 NOTE — TELEPHONE ENCOUNTER
Xenia message sent. Waiting for reply.    Jesús Randhawa, DO  You; [id number] minutes ago (11:15 AM)       Lets see if his insurance can get it through [facility] or [facility], remember [facility] needs to save deliver to patient what we will do is send it over to them and see if it is covered they usually will let the patient know I would rather him stay on the Trulicity instead of switching to Ozempic they are very similar though we may have to do that

## 2024-07-13 ENCOUNTER — PHARMACY VISIT (OUTPATIENT)
Dept: PHARMACY | Facility: CLINIC | Age: 54
End: 2024-07-13
Payer: MEDICARE

## 2024-07-13 ENCOUNTER — PHARMACY VISIT (OUTPATIENT)
Dept: PHARMACY | Facility: CLINIC | Age: 54
End: 2024-07-13

## 2024-07-22 ENCOUNTER — TELEPHONE (OUTPATIENT)
Dept: PRIMARY CARE | Facility: CLINIC | Age: 54
End: 2024-07-22

## 2024-07-22 ENCOUNTER — HOSPITAL ENCOUNTER (OUTPATIENT)
Dept: RADIOLOGY | Facility: CLINIC | Age: 54
Discharge: HOME | End: 2024-07-22
Payer: COMMERCIAL

## 2024-07-22 DIAGNOSIS — M54.50 CHRONIC LOW BACK PAIN, UNSPECIFIED BACK PAIN LATERALITY, UNSPECIFIED WHETHER SCIATICA PRESENT: ICD-10-CM

## 2024-07-22 DIAGNOSIS — G89.29 CHRONIC LOW BACK PAIN, UNSPECIFIED BACK PAIN LATERALITY, UNSPECIFIED WHETHER SCIATICA PRESENT: ICD-10-CM

## 2024-07-22 PROCEDURE — 72100 X-RAY EXAM L-S SPINE 2/3 VWS: CPT

## 2024-07-22 PROCEDURE — 72100 X-RAY EXAM L-S SPINE 2/3 VWS: CPT | Performed by: RADIOLOGY

## 2024-07-22 RX ORDER — CYCLOBENZAPRINE HCL 10 MG
TABLET ORAL
Qty: 30 TABLET | Refills: 0 | Status: SHIPPED | OUTPATIENT
Start: 2024-07-22

## 2024-07-22 NOTE — TELEPHONE ENCOUNTER
"Please advise.    Sergo Cordero \"Franko\"  [id number] [place] [id number] Clinical Support Staff  Phone Number: [phone]     I saw Dr. Randhawa on July 10 for back pain.  Since then the pain has gotten worse.  The past two days the muscles on the right side of my lower back have been spasming and causing enough pain the bring me to my knees.  I would like to ask Dr. Randhawa what he would recommend that I do.  Thank you,  Franko Cordero  "

## 2024-07-22 NOTE — TELEPHONE ENCOUNTER
She will need lumbar x-rays of her spine and then go ahead and call in some Flexeril 10 mg 1/2 to 1 tablet at bedtime,, and if things do not get better she may need therapy, they will not do MRIs of her back until she has had x-rays, unfortunately there is no real pain pills to give as they will make people constipated and tired   Patient aware. Medication pended and xrays ordered.

## 2024-07-25 NOTE — TELEPHONE ENCOUNTER
----- Message from Jesús Randhawa sent at 7/24/2024  8:04 PM EDT -----  X-rays are showing moderate arthritis, you may benefit from therapy can refer him to physical therapy which can make a big difference

## 2024-08-04 PROCEDURE — RXMED WILLOW AMBULATORY MEDICATION CHARGE

## 2024-08-07 ENCOUNTER — PHARMACY VISIT (OUTPATIENT)
Dept: PHARMACY | Facility: CLINIC | Age: 54
End: 2024-08-07
Payer: MEDICARE

## 2024-08-20 DIAGNOSIS — E11.9 TYPE 2 DIABETES MELLITUS WITHOUT COMPLICATIONS (MULTI): ICD-10-CM

## 2024-08-21 RX ORDER — BLOOD-GLUCOSE SENSOR
EACH MISCELLANEOUS
Qty: 6 EACH | Refills: 3 | Status: SHIPPED | OUTPATIENT
Start: 2024-08-21

## 2024-09-13 PROCEDURE — RXMED WILLOW AMBULATORY MEDICATION CHARGE

## 2024-09-17 ENCOUNTER — PHARMACY VISIT (OUTPATIENT)
Dept: PHARMACY | Facility: CLINIC | Age: 54
End: 2024-09-17
Payer: MEDICARE

## 2024-10-08 PROCEDURE — RXMED WILLOW AMBULATORY MEDICATION CHARGE

## 2024-10-10 ENCOUNTER — PHARMACY VISIT (OUTPATIENT)
Dept: PHARMACY | Facility: CLINIC | Age: 54
End: 2024-10-10
Payer: MEDICARE

## 2024-11-06 PROCEDURE — RXMED WILLOW AMBULATORY MEDICATION CHARGE

## 2024-11-12 ENCOUNTER — PHARMACY VISIT (OUTPATIENT)
Dept: PHARMACY | Facility: CLINIC | Age: 54
End: 2024-11-12
Payer: MEDICARE

## 2024-11-22 ENCOUNTER — APPOINTMENT (OUTPATIENT)
Dept: RADIOLOGY | Facility: HOSPITAL | Age: 54
End: 2024-11-22
Payer: COMMERCIAL

## 2024-11-22 ENCOUNTER — APPOINTMENT (OUTPATIENT)
Dept: CARDIOLOGY | Facility: HOSPITAL | Age: 54
End: 2024-11-22
Payer: COMMERCIAL

## 2024-11-22 ENCOUNTER — HOSPITAL ENCOUNTER (EMERGENCY)
Facility: HOSPITAL | Age: 54
Discharge: HOME | End: 2024-11-22
Attending: STUDENT IN AN ORGANIZED HEALTH CARE EDUCATION/TRAINING PROGRAM
Payer: COMMERCIAL

## 2024-11-22 VITALS
BODY MASS INDEX: 33.03 KG/M2 | TEMPERATURE: 97.2 F | DIASTOLIC BLOOD PRESSURE: 76 MMHG | WEIGHT: 223 LBS | HEART RATE: 80 BPM | OXYGEN SATURATION: 96 % | HEIGHT: 69 IN | RESPIRATION RATE: 18 BRPM | SYSTOLIC BLOOD PRESSURE: 118 MMHG

## 2024-11-22 DIAGNOSIS — R07.9 CHEST PAIN, UNSPECIFIED TYPE: Primary | ICD-10-CM

## 2024-11-22 LAB
ALBUMIN SERPL BCP-MCNC: 4.6 G/DL (ref 3.4–5)
ALP SERPL-CCNC: 85 U/L (ref 33–120)
ALT SERPL W P-5'-P-CCNC: 18 U/L (ref 10–52)
ANION GAP SERPL CALC-SCNC: 15 MMOL/L (ref 10–20)
AST SERPL W P-5'-P-CCNC: 14 U/L (ref 9–39)
BASOPHILS # BLD AUTO: 0.05 X10*3/UL (ref 0–0.1)
BASOPHILS NFR BLD AUTO: 0.7 %
BILIRUB SERPL-MCNC: 0.6 MG/DL (ref 0–1.2)
BNP SERPL-MCNC: 6 PG/ML (ref 0–99)
BUN SERPL-MCNC: 24 MG/DL (ref 6–23)
CALCIUM SERPL-MCNC: 9.4 MG/DL (ref 8.6–10.3)
CARDIAC TROPONIN I PNL SERPL HS: <3 NG/L (ref 0–20)
CARDIAC TROPONIN I PNL SERPL HS: <3 NG/L (ref 0–20)
CHLORIDE SERPL-SCNC: 100 MMOL/L (ref 98–107)
CO2 SERPL-SCNC: 25 MMOL/L (ref 21–32)
CREAT SERPL-MCNC: 1.13 MG/DL (ref 0.5–1.3)
D DIMER PPP FEU-MCNC: <215 NG/ML FEU
EGFRCR SERPLBLD CKD-EPI 2021: 77 ML/MIN/1.73M*2
EOSINOPHIL # BLD AUTO: 0.12 X10*3/UL (ref 0–0.7)
EOSINOPHIL NFR BLD AUTO: 1.6 %
ERYTHROCYTE [DISTWIDTH] IN BLOOD BY AUTOMATED COUNT: 11.9 % (ref 11.5–14.5)
GLUCOSE SERPL-MCNC: 180 MG/DL (ref 74–99)
HCT VFR BLD AUTO: 42.9 % (ref 41–52)
HGB BLD-MCNC: 15.3 G/DL (ref 13.5–17.5)
IMM GRANULOCYTES # BLD AUTO: 0.02 X10*3/UL (ref 0–0.7)
IMM GRANULOCYTES NFR BLD AUTO: 0.3 % (ref 0–0.9)
INR PPP: 1 (ref 0.9–1.1)
LYMPHOCYTES # BLD AUTO: 2.44 X10*3/UL (ref 1.2–4.8)
LYMPHOCYTES NFR BLD AUTO: 33.4 %
MCH RBC QN AUTO: 30.1 PG (ref 26–34)
MCHC RBC AUTO-ENTMCNC: 35.7 G/DL (ref 32–36)
MCV RBC AUTO: 84 FL (ref 80–100)
MONOCYTES # BLD AUTO: 0.8 X10*3/UL (ref 0.1–1)
MONOCYTES NFR BLD AUTO: 11 %
NEUTROPHILS # BLD AUTO: 3.87 X10*3/UL (ref 1.2–7.7)
NEUTROPHILS NFR BLD AUTO: 53 %
NRBC BLD-RTO: 0 /100 WBCS (ref 0–0)
PLATELET # BLD AUTO: 170 X10*3/UL (ref 150–450)
POTASSIUM SERPL-SCNC: 4.1 MMOL/L (ref 3.5–5.3)
PROT SERPL-MCNC: 6.8 G/DL (ref 6.4–8.2)
PROTHROMBIN TIME: 11.2 SECONDS (ref 9.8–12.8)
RBC # BLD AUTO: 5.09 X10*6/UL (ref 4.5–5.9)
SODIUM SERPL-SCNC: 136 MMOL/L (ref 136–145)
WBC # BLD AUTO: 7.3 X10*3/UL (ref 4.4–11.3)

## 2024-11-22 PROCEDURE — 93005 ELECTROCARDIOGRAM TRACING: CPT

## 2024-11-22 PROCEDURE — 85025 COMPLETE CBC W/AUTO DIFF WBC: CPT | Performed by: STUDENT IN AN ORGANIZED HEALTH CARE EDUCATION/TRAINING PROGRAM

## 2024-11-22 PROCEDURE — 85379 FIBRIN DEGRADATION QUANT: CPT

## 2024-11-22 PROCEDURE — 36415 COLL VENOUS BLD VENIPUNCTURE: CPT | Performed by: STUDENT IN AN ORGANIZED HEALTH CARE EDUCATION/TRAINING PROGRAM

## 2024-11-22 PROCEDURE — 80053 COMPREHEN METABOLIC PANEL: CPT | Performed by: STUDENT IN AN ORGANIZED HEALTH CARE EDUCATION/TRAINING PROGRAM

## 2024-11-22 PROCEDURE — 71045 X-RAY EXAM CHEST 1 VIEW: CPT

## 2024-11-22 PROCEDURE — 84484 ASSAY OF TROPONIN QUANT: CPT | Performed by: STUDENT IN AN ORGANIZED HEALTH CARE EDUCATION/TRAINING PROGRAM

## 2024-11-22 PROCEDURE — 83880 ASSAY OF NATRIURETIC PEPTIDE: CPT | Performed by: STUDENT IN AN ORGANIZED HEALTH CARE EDUCATION/TRAINING PROGRAM

## 2024-11-22 PROCEDURE — 99284 EMERGENCY DEPT VISIT MOD MDM: CPT | Mod: 25

## 2024-11-22 PROCEDURE — 71045 X-RAY EXAM CHEST 1 VIEW: CPT | Mod: FOREIGN READ | Performed by: RADIOLOGY

## 2024-11-22 PROCEDURE — 85610 PROTHROMBIN TIME: CPT | Performed by: STUDENT IN AN ORGANIZED HEALTH CARE EDUCATION/TRAINING PROGRAM

## 2024-11-22 ASSESSMENT — PAIN DESCRIPTION - LOCATION
LOCATION: CHEST
LOCATION: CHEST

## 2024-11-22 ASSESSMENT — PAIN DESCRIPTION - DESCRIPTORS: DESCRIPTORS: SHARP

## 2024-11-22 ASSESSMENT — PAIN DESCRIPTION - PAIN TYPE
TYPE: ACUTE PAIN
TYPE: ACUTE PAIN

## 2024-11-22 ASSESSMENT — COLUMBIA-SUICIDE SEVERITY RATING SCALE - C-SSRS
1. IN THE PAST MONTH, HAVE YOU WISHED YOU WERE DEAD OR WISHED YOU COULD GO TO SLEEP AND NOT WAKE UP?: NO
6. HAVE YOU EVER DONE ANYTHING, STARTED TO DO ANYTHING, OR PREPARED TO DO ANYTHING TO END YOUR LIFE?: NO
2. HAVE YOU ACTUALLY HAD ANY THOUGHTS OF KILLING YOURSELF?: NO

## 2024-11-22 ASSESSMENT — PAIN - FUNCTIONAL ASSESSMENT
PAIN_FUNCTIONAL_ASSESSMENT: 0-10
PAIN_FUNCTIONAL_ASSESSMENT: 0-10

## 2024-11-22 ASSESSMENT — PAIN SCALES - GENERAL
PAINLEVEL_OUTOF10: 4
PAINLEVEL_OUTOF10: 7
PAINLEVEL_OUTOF10: 4

## 2024-11-22 ASSESSMENT — PAIN DESCRIPTION - PROGRESSION: CLINICAL_PROGRESSION: NOT CHANGED

## 2024-11-23 LAB
ATRIAL RATE: 82 BPM
P AXIS: 39 DEGREES
P OFFSET: 169 MS
P ONSET: 114 MS
PR INTERVAL: 198 MS
Q ONSET: 213 MS
QRS COUNT: 13 BEATS
QRS DURATION: 100 MS
QT INTERVAL: 382 MS
QTC CALCULATION(BAZETT): 446 MS
QTC FREDERICIA: 424 MS
R AXIS: -54 DEGREES
T AXIS: 23 DEGREES
T OFFSET: 404 MS
VENTRICULAR RATE: 82 BPM

## 2024-11-23 NOTE — ED PROVIDER NOTES
HPI   Chief Complaint   Patient presents with    Chest Pain     Midsternal CP with SOB starting about 0430 today.        Patient is a 53-year-old male with history of hyperlipidemia, diabetes presenting [facility] ED for chest pain, shortness of breath that started around 4:30 PM today.  Patient reports that he had some radiation down the left arm as well.  Patient reports the chest pain had improved on its own.  Patient denied any lightheadedness, nausea, vomiting, dizziness, changes in vision with feet chest pain.              Patient History   Past Medical History:   Diagnosis Date    Diabetes mellitus (Multi)     Personal history of other endocrine, nutritional and metabolic disease     History of diabetes mellitus     Past Surgical History:   Procedure Laterality Date    EYE SURGERY      VASECTOMY       Family History   Problem Relation Name Age of Onset    Heart disease Mother Pauline      Social History     Tobacco Use    Smoking status: Never    Smokeless tobacco: Never   Vaping Use    Vaping status: Never Used   Substance Use Topics    Alcohol use: Yes     Comment: social    Drug use: Never       Physical Exam   ED Triage Vitals [11/22/24 1808]   Temperature Heart Rate Respirations BP   36.2 °C (97.2 °F) 86 20 155/90      Pulse Ox Temp Source Heart Rate Source Patient Position   99 % Temporal Monitor Sitting      BP Location FiO2 (%)     Right arm --       Physical Exam  Constitutional:       Appearance: Normal appearance. He is normal weight.   HENT:      Head: Normocephalic and atraumatic.      Nose: Nose normal.      Mouth/Throat:      Mouth: Mucous membranes are moist.      Pharynx: Oropharynx is clear.   Eyes:      Extraocular Movements: Extraocular movements intact.      Conjunctiva/sclera: Conjunctivae normal.      Pupils: Pupils are equal, round, and reactive to light.   Cardiovascular:      Rate and Rhythm: Normal rate and regular rhythm.      Pulses: Normal pulses.      Heart sounds: Normal heart  sounds.   Pulmonary:      Effort: Pulmonary effort is normal.      Breath sounds: Normal breath sounds.   Abdominal:      General: Abdomen is flat. Bowel sounds are normal.      Palpations: Abdomen is soft.   Musculoskeletal:         General: Normal range of motion.      Cervical back: Normal range of motion and neck supple.   Skin:     General: Skin is warm and dry.      Capillary Refill: Capillary refill takes less than 2 seconds.   Neurological:      General: No focal deficit present.      Mental Status: He is alert and oriented to person, place, and time. Mental status is at baseline.   Psychiatric:         Mood and Affect: Mood normal.         Behavior: Behavior normal.           ED Course & MDM   ED Course as of 11/23/24 0130 Fri Nov 22, 2024 1904 EKG taken at 1806 on 22 November 2024 showing normal sinus rate and rhythm, left axis deviation, normal intervals, no acute ST elevation or depression [DS]   2040 EKG taken at 1917 on 22 November 2024 showing normal sinus rate and rhythm, left axis deviation, normal intervals, no acute ST elevation or depression [DS]      ED Course User Index  [DS] Keagan Beatty MD         Diagnoses as of 11/23/24 0130   Chest pain, unspecified type                 No data recorded     Flippin Coma Scale Score: 15 (11/22/24 1838 : Francis Green RN)                           Medical Decision Making  Patient is a 53 y.o. male who presents to [facility] ED for Chest Pain (Midsternal CP with SOB starting about 0430 today. ). On initial ED evaluation, patient found to be in no acute distress. Per HPI, concern to evaluate and treat for ACS, PE rule out.  Obtain cardiac labs and diagnostics including D-dimer.  Patient EKG showed no acute ischemic change.  Patient D-dimer negative.  Low concern for PE at this time.  Patient not tachycardic, not hypoxic not hypotensive.  Patient had negative delta troponin series.  Patient CMP showed no concerning MARK ANTHONY or electrolyte abnormality.  CBC showed  no concerning leukocytosis or anemia.  Patient BNP resulted at 6, within normal limits.  Patient has heart score of 3.  Patient advised to follow-up with outpatient cardiology.  Patient to be discharged home.  Diagnostic findings and treatment plan discussed with patient.  Patient amenable to plan.    Patient to follow up with cardiology, referral provided. Anticipatory guidance and return precautions provided.  Patient otherwise stable for discharge.          Procedure  Procedures     Joseph Rivers MD  Resident  11/23/24 0133

## 2024-11-23 NOTE — DISCHARGE INSTRUCTIONS
Please follow-up with cardiology referral.  Please follow-up with your PCP as well.  Please return close ED if you develop any worsening symptoms such as severe chest pain, shortness of breath, lightheadedness, dizziness, excessive sweating, vision change, or weakness.

## 2024-11-29 LAB
ATRIAL RATE: 82 BPM
ATRIAL RATE: 87 BPM
P AXIS: 39 DEGREES
P AXIS: 44 DEGREES
P OFFSET: 169 MS
P OFFSET: 179 MS
P ONSET: 114 MS
P ONSET: 123 MS
PR INTERVAL: 190 MS
PR INTERVAL: 198 MS
Q ONSET: 213 MS
Q ONSET: 218 MS
QRS COUNT: 13 BEATS
QRS COUNT: 15 BEATS
QRS DURATION: 100 MS
QRS DURATION: 92 MS
QT INTERVAL: 364 MS
QT INTERVAL: 382 MS
QTC CALCULATION(BAZETT): 438 MS
QTC CALCULATION(BAZETT): 446 MS
QTC FREDERICIA: 411 MS
QTC FREDERICIA: 424 MS
R AXIS: -53 DEGREES
R AXIS: -54 DEGREES
T AXIS: 23 DEGREES
T AXIS: 31 DEGREES
T OFFSET: 400 MS
T OFFSET: 404 MS
VENTRICULAR RATE: 82 BPM
VENTRICULAR RATE: 87 BPM

## 2024-12-02 ENCOUNTER — APPOINTMENT (OUTPATIENT)
Dept: CARDIOLOGY | Facility: CLINIC | Age: 54
End: 2024-12-02
Payer: COMMERCIAL

## 2024-12-02 VITALS
HEIGHT: 70 IN | SYSTOLIC BLOOD PRESSURE: 116 MMHG | BODY MASS INDEX: 31.92 KG/M2 | WEIGHT: 223 LBS | DIASTOLIC BLOOD PRESSURE: 70 MMHG | HEART RATE: 72 BPM

## 2024-12-02 DIAGNOSIS — Z78.9 NEVER SMOKED TOBACCO: ICD-10-CM

## 2024-12-02 DIAGNOSIS — R07.89 OTHER CHEST PAIN: ICD-10-CM

## 2024-12-02 DIAGNOSIS — R94.31 ABNORMAL EKG: ICD-10-CM

## 2024-12-02 DIAGNOSIS — R07.9 CHEST PAIN, UNSPECIFIED TYPE: ICD-10-CM

## 2024-12-02 DIAGNOSIS — E78.5 DYSLIPIDEMIA: ICD-10-CM

## 2024-12-02 PROCEDURE — 3078F DIAST BP <80 MM HG: CPT | Performed by: INTERNAL MEDICINE

## 2024-12-02 PROCEDURE — 1036F TOBACCO NON-USER: CPT | Performed by: INTERNAL MEDICINE

## 2024-12-02 PROCEDURE — 3062F POS MACROALBUMINURIA REV: CPT | Performed by: INTERNAL MEDICINE

## 2024-12-02 PROCEDURE — 3049F LDL-C 100-129 MG/DL: CPT | Performed by: INTERNAL MEDICINE

## 2024-12-02 PROCEDURE — 3008F BODY MASS INDEX DOCD: CPT | Performed by: INTERNAL MEDICINE

## 2024-12-02 PROCEDURE — 3044F HG A1C LEVEL LT 7.0%: CPT | Performed by: INTERNAL MEDICINE

## 2024-12-02 PROCEDURE — 3074F SYST BP LT 130 MM HG: CPT | Performed by: INTERNAL MEDICINE

## 2024-12-02 PROCEDURE — 99205 OFFICE O/P NEW HI 60 MIN: CPT | Performed by: INTERNAL MEDICINE

## 2024-12-02 RX ORDER — ATENOLOL 100 MG/1
TABLET ORAL
Qty: 1 TABLET | Refills: 0 | Status: SHIPPED | OUTPATIENT
Start: 2024-12-02

## 2024-12-02 NOTE — PATIENT INSTRUCTIONS
Echo soon   Cardiac CT Angiography soon  Follow up after .  Atenolol 100mg  one tablet 2 hours before test.  Patient educated on proper medication use.   Patient educated on risk factor modification.   Please bring any lab results from other providers / physicians to your next appointment.     Please bring all medicines, vitamins, and herbal supplements with you when you come to the office.     Prescriptions will not be filled unless you are compliant with your follow up appointments or have a follow up appointment scheduled as per instruction of your physician. Refills should be requested at the time of your visit.

## 2024-12-03 PROCEDURE — RXMED WILLOW AMBULATORY MEDICATION CHARGE

## 2024-12-04 ENCOUNTER — PHARMACY VISIT (OUTPATIENT)
Dept: PHARMACY | Facility: CLINIC | Age: 54
End: 2024-12-04
Payer: MEDICARE

## 2024-12-20 DIAGNOSIS — E11.9 TYPE 2 DIABETES MELLITUS WITHOUT COMPLICATION, WITHOUT LONG-TERM CURRENT USE OF INSULIN (MULTI): ICD-10-CM

## 2024-12-20 DIAGNOSIS — E11.9 TYPE 2 DIABETES MELLITUS WITHOUT COMPLICATIONS (MULTI): ICD-10-CM

## 2024-12-20 DIAGNOSIS — E78.5 DYSLIPIDEMIA: ICD-10-CM

## 2024-12-20 RX ORDER — EMPAGLIFLOZIN AND METFORMIN HYDROCHLORIDE 12.5; 5 MG/1; MG/1
1 TABLET ORAL 2 TIMES DAILY
Qty: 60 TABLET | Refills: 0 | Status: SHIPPED | OUTPATIENT
Start: 2024-12-20

## 2024-12-20 RX ORDER — ATORVASTATIN CALCIUM 20 MG/1
20 TABLET, FILM COATED ORAL DAILY
Qty: 30 TABLET | Refills: 0 | Status: SHIPPED | OUTPATIENT
Start: 2024-12-20

## 2024-12-20 NOTE — TELEPHONE ENCOUNTER
I am requesting that the prescriptions for synjardy and atorvastatin to be refreshed.   I have a weeks supply and could not request on line.

## 2024-12-23 ENCOUNTER — HOSPITAL ENCOUNTER (OUTPATIENT)
Dept: CARDIOLOGY | Facility: CLINIC | Age: 54
Discharge: HOME | End: 2024-12-23
Payer: COMMERCIAL

## 2024-12-23 DIAGNOSIS — R07.9 CHEST PAIN, UNSPECIFIED TYPE: ICD-10-CM

## 2024-12-23 DIAGNOSIS — R94.31 ABNORMAL EKG: ICD-10-CM

## 2024-12-23 PROCEDURE — 93306 TTE W/DOPPLER COMPLETE: CPT

## 2024-12-23 PROCEDURE — 93306 TTE W/DOPPLER COMPLETE: CPT | Performed by: INTERNAL MEDICINE

## 2024-12-24 LAB
AORTIC VALVE MEAN GRADIENT: 6 MMHG
AORTIC VALVE PEAK VELOCITY: 1.53 M/S
AV PEAK GRADIENT: 9 MMHG
AVA (PEAK VEL): 2.67 CM2
AVA (VTI): 2.64 CM2
EJECTION FRACTION APICAL 4 CHAMBER: 55.3
EJECTION FRACTION: 55 %
LEFT VENTRICLE INTERNAL DIMENSION DIASTOLE: 4.4 CM (ref 3.5–6)
LEFT VENTRICULAR OUTFLOW TRACT DIAMETER: 2 CM
LV EJECTION FRACTION BIPLANE: 54 %
MITRAL VALVE E/A RATIO: 0.76
MITRAL VALVE E/E' RATIO: 7.8
RIGHT VENTRICLE PEAK SYSTOLIC PRESSURE: 17.3 MMHG

## 2024-12-31 DIAGNOSIS — E11.9 CONTROLLED TYPE 2 DIABETES MELLITUS WITHOUT COMPLICATION, WITHOUT LONG-TERM CURRENT USE OF INSULIN (MULTI): ICD-10-CM

## 2024-12-31 PROCEDURE — RXMED WILLOW AMBULATORY MEDICATION CHARGE

## 2024-12-31 RX ORDER — DULAGLUTIDE 3 MG/.5ML
3 INJECTION, SOLUTION SUBCUTANEOUS
Qty: 6 ML | Refills: 0 | Status: SHIPPED | OUTPATIENT
Start: 2024-12-31

## 2025-01-02 ENCOUNTER — HOSPITAL ENCOUNTER (OUTPATIENT)
Dept: RADIOLOGY | Facility: CLINIC | Age: 55
Discharge: HOME | End: 2025-01-02
Payer: COMMERCIAL

## 2025-01-02 VITALS
RESPIRATION RATE: 20 BRPM | SYSTOLIC BLOOD PRESSURE: 119 MMHG | HEART RATE: 83 BPM | OXYGEN SATURATION: 100 % | DIASTOLIC BLOOD PRESSURE: 74 MMHG

## 2025-01-02 DIAGNOSIS — R07.9 CHEST PAIN, UNSPECIFIED TYPE: ICD-10-CM

## 2025-01-02 DIAGNOSIS — R94.31 ABNORMAL EKG: ICD-10-CM

## 2025-01-02 PROCEDURE — 2550000001 HC RX 255 CONTRASTS: Performed by: INTERNAL MEDICINE

## 2025-01-02 PROCEDURE — 2500000004 HC RX 250 GENERAL PHARMACY W/ HCPCS (ALT 636 FOR OP/ED): Performed by: INTERNAL MEDICINE

## 2025-01-02 PROCEDURE — 75574 CT ANGIO HRT W/3D IMAGE: CPT

## 2025-01-02 PROCEDURE — 2500000001 HC RX 250 WO HCPCS SELF ADMINISTERED DRUGS (ALT 637 FOR MEDICARE OP): Performed by: INTERNAL MEDICINE

## 2025-01-02 RX ORDER — NITROGLYCERIN 400 UG/1
2 SPRAY ORAL ONCE
Status: COMPLETED | OUTPATIENT
Start: 2025-01-02 | End: 2025-01-02

## 2025-01-02 RX ORDER — ATENOLOL 100 MG/1
100 TABLET ORAL ONCE
Status: COMPLETED | OUTPATIENT
Start: 2025-01-02 | End: 2025-01-02

## 2025-01-02 RX ORDER — METOPROLOL TARTRATE 1 MG/ML
10 INJECTION, SOLUTION INTRAVENOUS ONCE
Status: COMPLETED | OUTPATIENT
Start: 2025-01-02 | End: 2025-01-02

## 2025-01-02 RX ADMIN — METOPROLOL TARTRATE 10 MG: 5 INJECTION INTRAVENOUS at 09:35

## 2025-01-02 RX ADMIN — ATENOLOL 100 MG: 25 TABLET ORAL at 08:53

## 2025-01-02 RX ADMIN — METOPROLOL TARTRATE 10 MG: 5 INJECTION INTRAVENOUS at 09:30

## 2025-01-02 RX ADMIN — NITROGLYCERIN 2 SPRAY: 400 SPRAY ORAL at 09:40

## 2025-01-02 RX ADMIN — IOHEXOL 85 ML: 350 INJECTION, SOLUTION INTRAVENOUS at 10:44

## 2025-01-02 NOTE — NURSING NOTE
Post CCTA pt denies feeling dizzy or lightheaded, denies headache. Steady on feet, skin warm pink and dry. Pt verbalized understanding of increased water intake x24 hours, educated on side effects of medications. HL removed with tip intact, manual pressure held until hemostasis achieved, 2x2 and coban to site. Pt DC home to self care with friend.

## 2025-01-06 ENCOUNTER — PHARMACY VISIT (OUTPATIENT)
Dept: PHARMACY | Facility: CLINIC | Age: 55
End: 2025-01-06
Payer: MEDICARE

## 2025-01-13 ENCOUNTER — APPOINTMENT (OUTPATIENT)
Dept: CARDIOLOGY | Facility: CLINIC | Age: 55
End: 2025-01-13
Payer: COMMERCIAL

## 2025-01-14 DIAGNOSIS — E78.5 DYSLIPIDEMIA: ICD-10-CM

## 2025-01-14 DIAGNOSIS — E11.9 TYPE 2 DIABETES MELLITUS WITHOUT COMPLICATION, WITHOUT LONG-TERM CURRENT USE OF INSULIN (MULTI): ICD-10-CM

## 2025-01-15 RX ORDER — ATORVASTATIN CALCIUM 20 MG/1
20 TABLET, FILM COATED ORAL DAILY
Qty: 30 TABLET | Refills: 0 | Status: SHIPPED | OUTPATIENT
Start: 2025-01-15

## 2025-01-15 NOTE — PROGRESS NOTES
"Subjective   Patient ID: Sergo Cordero \"Franko\" is a 54 y.o. male who presents for Hypertension, Hyperlipidemia, Hypothyroidism, and Diabetes.    HPI    Patient presents today for HTN, DM, and HLD follow up.    He does not eat a low sodium, low sugar, low cholesterol diet. Eats three times a day. He likes french fries. For breakfast, he eats two eggs, cheese, two sausages and denies carbs or apple juice. He takes little carbs in lunch. He doesn't drink sugary drinks.   He does not exercise.   He is taking Trulicity every Friday but states it doesn't suppress his appetite. However, he lost weight with Trulicity.  He does check his sugars at home.   He does check his BP at home.   No eye exam this year.  Last BW was 4/10/24.   Last A1C was 6.6%.   Today, A1c is 9.0%.  Is on statin therapy.       Review of systems  ; Patient seen today for exam denies any problems with headaches or vision, denies any shortness of breath chest pain nausea or vomiting, no black stool no blood in the stool no heartburn type symptoms denies any problems with constipation or diarrhea, and no dysuria-type symptoms    The patient's allergies medications were reviewed with them today    The patient's social family and surgical history or also reviewed here today, along with her past medical history.     Objective     Alert and active in  no acute distress  HEENT TMs clear oropharynx negative nares clear no drainage noted neck supple  With no adenopathy   Heart regular rate and rhythm without murmur and no carotid bruits  Lungs- clear to auscultation bilaterally, no wheeze or rhonchi noted  Thyroid -negative masses or nodularity  Abdomen- soft times four quadrants , bowel sounds positive no masses or organomegaly, negative tenderness guarding or rebound  Neurological exam unremarkable- DTRs in upper and lower extremities within normal limits.   skin -no lesions noted      /80 (BP Location: Right arm, Patient Position: Sitting, BP Cuff " "Size: Adult)   Pulse 92   Temp 36.2 °C (97.2 °F) (Temporal)   Ht 1.753 m (5' 9\")   Wt 101 kg (222 lb 3.2 oz)   SpO2 96%   BMI 32.81 kg/m²     No Known Allergies    Assessment/Plan   Problem List Items Addressed This Visit       Diabetes (Multi)    Dyslipidemia    BMI 32.0-32.9,adult    Controlled type 2 diabetes mellitus without complication, without long-term current use of insulin (Multi)    Relevant Orders    POCT glycosylated hemoglobin (Hb A1C) manually resulted (Completed)     Other Visit Diagnoses       Chronic low back pain, unspecified back pain laterality, unspecified whether sciatica present        Type 2 diabetes mellitus without complications (Multi)        Relevant Medications    empagliflozin-metformin (Synjardy) 12.5-500 mg    Class 1 obesity due to excess calories with body mass index (BMI) of 32.0 to 32.9 in adult, unspecified whether serious comorbidity present                Discussed patient's BMI and to institute calorie reduction and increase exercise to decrease risk of diabetes and heart disease in the future.    A1c performed today, increased to 9.0%.     Refilled Synjardy.    Importance of healthy diet and regular exercise regimen discussed.  Recommended no carb in diet.  Reduce bread, pasta, potato, rice, bananas, and sugar.  Take vegetables, chicken, fish in diet.    Continue Trulicity at the current dose, and if there is no improvement in the blood sugar, we will increase the dose of Trulicity followed by switching to Mounjaro if no further improvement.     We made a shared decision to not increase the Trulicity at this time as he knows his diet has not been the best over this last several months    We discussed that it is catastrophic that he went from 6-9 reviewed his glucometer which also is reliable looking at his sugars    Follow up in 3 months or sooner if needed.    If anything worsens or changes please call us at once, follow up in the office as planned,       Scribe " Attestation  By signing my name below, I, Marilyn Awan   attest that this documentation has been prepared under the direction and in the presence of Jesús Randhawa DO.

## 2025-01-16 ENCOUNTER — OFFICE VISIT (OUTPATIENT)
Dept: PRIMARY CARE | Facility: CLINIC | Age: 55
End: 2025-01-16
Payer: COMMERCIAL

## 2025-01-16 VITALS
TEMPERATURE: 97.2 F | WEIGHT: 222.2 LBS | SYSTOLIC BLOOD PRESSURE: 130 MMHG | OXYGEN SATURATION: 96 % | BODY MASS INDEX: 32.91 KG/M2 | HEIGHT: 69 IN | HEART RATE: 92 BPM | DIASTOLIC BLOOD PRESSURE: 80 MMHG

## 2025-01-16 DIAGNOSIS — G89.29 CHRONIC LOW BACK PAIN, UNSPECIFIED BACK PAIN LATERALITY, UNSPECIFIED WHETHER SCIATICA PRESENT: ICD-10-CM

## 2025-01-16 DIAGNOSIS — E13.69 OTHER SPECIFIED DIABETES MELLITUS WITH OTHER SPECIFIED COMPLICATION, UNSPECIFIED WHETHER LONG TERM INSULIN USE (MULTI): ICD-10-CM

## 2025-01-16 DIAGNOSIS — E66.09 CLASS 1 OBESITY DUE TO EXCESS CALORIES WITH BODY MASS INDEX (BMI) OF 32.0 TO 32.9 IN ADULT, UNSPECIFIED WHETHER SERIOUS COMORBIDITY PRESENT: ICD-10-CM

## 2025-01-16 DIAGNOSIS — E11.9 TYPE 2 DIABETES MELLITUS WITHOUT COMPLICATIONS (MULTI): ICD-10-CM

## 2025-01-16 DIAGNOSIS — E78.5 DYSLIPIDEMIA: ICD-10-CM

## 2025-01-16 DIAGNOSIS — E11.9 TYPE 2 DIABETES MELLITUS WITHOUT COMPLICATION, WITHOUT LONG-TERM CURRENT USE OF INSULIN (MULTI): ICD-10-CM

## 2025-01-16 DIAGNOSIS — E66.811 CLASS 1 OBESITY DUE TO EXCESS CALORIES WITH BODY MASS INDEX (BMI) OF 32.0 TO 32.9 IN ADULT, UNSPECIFIED WHETHER SERIOUS COMORBIDITY PRESENT: ICD-10-CM

## 2025-01-16 DIAGNOSIS — E11.9 CONTROLLED TYPE 2 DIABETES MELLITUS WITHOUT COMPLICATION, WITHOUT LONG-TERM CURRENT USE OF INSULIN (MULTI): ICD-10-CM

## 2025-01-16 DIAGNOSIS — M54.50 CHRONIC LOW BACK PAIN, UNSPECIFIED BACK PAIN LATERALITY, UNSPECIFIED WHETHER SCIATICA PRESENT: ICD-10-CM

## 2025-01-16 LAB — POC HEMOGLOBIN A1C: 9 % (ref 4.2–6.5)

## 2025-01-16 PROCEDURE — 3075F SYST BP GE 130 - 139MM HG: CPT | Performed by: FAMILY MEDICINE

## 2025-01-16 PROCEDURE — 3008F BODY MASS INDEX DOCD: CPT | Performed by: FAMILY MEDICINE

## 2025-01-16 PROCEDURE — 3079F DIAST BP 80-89 MM HG: CPT | Performed by: FAMILY MEDICINE

## 2025-01-16 PROCEDURE — 83036 HEMOGLOBIN GLYCOSYLATED A1C: CPT | Performed by: FAMILY MEDICINE

## 2025-01-16 PROCEDURE — 99214 OFFICE O/P EST MOD 30 MIN: CPT | Performed by: FAMILY MEDICINE

## 2025-01-16 RX ORDER — EMPAGLIFLOZIN AND METFORMIN HYDROCHLORIDE 12.5; 5 MG/1; MG/1
1 TABLET ORAL 2 TIMES DAILY
Qty: 180 TABLET | Refills: 1 | Status: SHIPPED | OUTPATIENT
Start: 2025-01-16

## 2025-01-17 ENCOUNTER — HOSPITAL ENCOUNTER (OUTPATIENT)
Dept: RADIOLOGY | Facility: CLINIC | Age: 55
End: 2025-01-17
Payer: COMMERCIAL

## 2025-01-20 ENCOUNTER — TELEPHONE (OUTPATIENT)
Dept: PRIMARY CARE | Facility: CLINIC | Age: 55
End: 2025-01-20
Payer: COMMERCIAL

## 2025-01-20 NOTE — TELEPHONE ENCOUNTER
For the past three days I have had extreme exhaustion and no appetite.   There are no gastrointestinal issues or fever.  There is nausea when I eat.  BP is 113/81 HR 90.  Should I schedule a visit?

## 2025-01-20 NOTE — TELEPHONE ENCOUNTER
This is most likely because his sugars were so bad, from previous also could be having a start of a virus, will be glad to see him if he wants to be seen       Selvzt message sent.

## 2025-01-22 ENCOUNTER — OFFICE VISIT (OUTPATIENT)
Dept: PRIMARY CARE | Facility: CLINIC | Age: 55
End: 2025-01-22
Payer: COMMERCIAL

## 2025-01-22 VITALS
HEIGHT: 69 IN | BODY MASS INDEX: 31.46 KG/M2 | WEIGHT: 212.4 LBS | HEART RATE: 96 BPM | OXYGEN SATURATION: 98 % | SYSTOLIC BLOOD PRESSURE: 132 MMHG | TEMPERATURE: 97.7 F | DIASTOLIC BLOOD PRESSURE: 78 MMHG

## 2025-01-22 DIAGNOSIS — R73.9 HYPERGLYCEMIA: ICD-10-CM

## 2025-01-22 DIAGNOSIS — R53.83 OTHER FATIGUE: Primary | ICD-10-CM

## 2025-01-22 DIAGNOSIS — R53.1 WEAKNESS: ICD-10-CM

## 2025-01-22 DIAGNOSIS — R63.0 LACK OF APPETITE: ICD-10-CM

## 2025-01-22 DIAGNOSIS — E66.811 CLASS 1 OBESITY DUE TO EXCESS CALORIES WITH SERIOUS COMORBIDITY AND BODY MASS INDEX (BMI) OF 31.0 TO 31.9 IN ADULT: ICD-10-CM

## 2025-01-22 DIAGNOSIS — E66.09 CLASS 1 OBESITY DUE TO EXCESS CALORIES WITH SERIOUS COMORBIDITY AND BODY MASS INDEX (BMI) OF 31.0 TO 31.9 IN ADULT: ICD-10-CM

## 2025-01-22 PROCEDURE — 3075F SYST BP GE 130 - 139MM HG: CPT | Performed by: FAMILY MEDICINE

## 2025-01-22 PROCEDURE — 3008F BODY MASS INDEX DOCD: CPT | Performed by: FAMILY MEDICINE

## 2025-01-22 PROCEDURE — 99214 OFFICE O/P EST MOD 30 MIN: CPT | Performed by: FAMILY MEDICINE

## 2025-01-22 PROCEDURE — 3078F DIAST BP <80 MM HG: CPT | Performed by: FAMILY MEDICINE

## 2025-01-22 PROCEDURE — 1036F TOBACCO NON-USER: CPT | Performed by: FAMILY MEDICINE

## 2025-01-22 ASSESSMENT — PATIENT HEALTH QUESTIONNAIRE - PHQ9
SUM OF ALL RESPONSES TO PHQ9 QUESTIONS 1 AND 2: 0
1. LITTLE INTEREST OR PLEASURE IN DOING THINGS: NOT AT ALL
2. FEELING DOWN, DEPRESSED OR HOPELESS: NOT AT ALL

## 2025-01-22 NOTE — PROGRESS NOTES
"Subjective   Patient ID: Sergo Cordero \"Rudy" is a 54 y.o. male who presents for Fatigue, Extremity Weakness, and lack of appetite.    HPI    Patient presents today for fatigue.   Ongoing x 4 days.   Other symptoms include lack of appetite and dizziness when standing.   Has tested negative for COVID.   Denies chills or fever or blurred vision.  Has tried nothing.    Patient reports eating green chicken salad and waking up the next morning to make himself a cheese omelet which caused choking and nausea.  By lunchtime, he felt nauseous and noted extreme exhaustion and fatigue.   His morning blood sugar was 146, and his blood pressure was 113/76.   His heart rate remained at 90 throughout this time.   He states he stays well-hydrated.   He did not take anything over the weekend.   Patient confirms that he is feeling well today.    Vaccinations are up-to-date.       Review of systems  ; Patient seen today for exam denies any problems with headaches or vision, denies any shortness of breath chest pain nausea or vomiting, no black stool no blood in the stool no heartburn type symptoms denies any problems with constipation or diarrhea, and no dysuria-type symptoms    The patient's allergies medications were reviewed with them today    The patient's social family and surgical history or also reviewed here today, along with her past medical history.     Objective     Alert and active in  no acute distress  HEENT TMs clear oropharynx negative nares clear no drainage noted neck supple  With no adenopathy   Heart regular rate and rhythm without murmur and no carotid bruits  Lungs- clear to auscultation bilaterally, no wheeze or rhonchi noted  Thyroid -negative masses or nodularity  Abdomen- soft times four quadrants , bowel sounds positive no masses or organomegaly, negative tenderness guarding or rebound  Neurological exam unremarkable- DTRs in upper and lower extremities within normal limits.   skin -no lesions " "noted      /78 (BP Location: Left arm, Patient Position: Sitting)   Pulse 96   Temp 36.5 °C (97.7 °F) (Temporal)   Ht 1.753 m (5' 9\")   Wt 96.3 kg (212 lb 6.4 oz)   SpO2 98%   BMI 31.37 kg/m²     No Known Allergies    Assessment/Plan   Problem List Items Addressed This Visit       Fatigue - Primary     Other Visit Diagnoses       Lack of appetite        Weakness        BMI 31.0-31.9,adult        Class 1 obesity due to excess calories with serious comorbidity and body mass index (BMI) of 31.0 to 31.9 in adult        Hyperglycemia                Continue current dose of medication.     Drink plenty of fluid and remain hydrated.    Recommended eating more vegetables, protein, chicken, fish in diet.    Discussed the importance of healthy diet, regular exercise, and walking.    If anything worsens or changes please call us at once, follow up in the office as planned, but I think most of this is from his sugars coming Sobo and so high we discussed signs of hypoglycemia with his medications it does not usually do so      Scribe Attestation  By signing my name below, I, Kristi Miller, Scribe   attest that this documentation has been prepared under the direction and in the presence of Jesús Randhawa DO.  "

## 2025-01-28 PROCEDURE — RXMED WILLOW AMBULATORY MEDICATION CHARGE

## 2025-01-31 ENCOUNTER — PHARMACY VISIT (OUTPATIENT)
Dept: PHARMACY | Facility: CLINIC | Age: 55
End: 2025-01-31
Payer: MEDICARE

## 2025-02-06 ENCOUNTER — APPOINTMENT (OUTPATIENT)
Dept: CARDIOLOGY | Facility: CLINIC | Age: 55
End: 2025-02-06
Payer: COMMERCIAL

## 2025-02-06 VITALS
HEIGHT: 69 IN | WEIGHT: 217 LBS | HEART RATE: 80 BPM | SYSTOLIC BLOOD PRESSURE: 128 MMHG | BODY MASS INDEX: 32.14 KG/M2 | DIASTOLIC BLOOD PRESSURE: 72 MMHG

## 2025-02-06 DIAGNOSIS — E78.5 DYSLIPIDEMIA: ICD-10-CM

## 2025-02-06 DIAGNOSIS — I51.7 MILD LEFT VENTRICULAR HYPERTROPHY: ICD-10-CM

## 2025-02-06 DIAGNOSIS — E11.9 CONTROLLED TYPE 2 DIABETES MELLITUS WITHOUT COMPLICATION, WITHOUT LONG-TERM CURRENT USE OF INSULIN (MULTI): ICD-10-CM

## 2025-02-06 DIAGNOSIS — R94.31 ABNORMAL EKG: ICD-10-CM

## 2025-02-06 DIAGNOSIS — Z78.9 NEVER SMOKED TOBACCO: ICD-10-CM

## 2025-02-06 DIAGNOSIS — G47.9 SLEEP DISTURBANCE: ICD-10-CM

## 2025-02-06 PROCEDURE — 99214 OFFICE O/P EST MOD 30 MIN: CPT | Performed by: INTERNAL MEDICINE

## 2025-02-06 PROCEDURE — 3074F SYST BP LT 130 MM HG: CPT | Performed by: INTERNAL MEDICINE

## 2025-02-06 PROCEDURE — 3078F DIAST BP <80 MM HG: CPT | Performed by: INTERNAL MEDICINE

## 2025-02-06 PROCEDURE — 3008F BODY MASS INDEX DOCD: CPT | Performed by: INTERNAL MEDICINE

## 2025-02-06 PROCEDURE — 1036F TOBACCO NON-USER: CPT | Performed by: INTERNAL MEDICINE

## 2025-02-06 NOTE — PATIENT INSTRUCTIONS
Home Sleep Study  BP instructions  Continue same medications and treatments.   Patient educated on proper medication use.   Patient educated on risk factor modification.   Please bring any lab results from other providers / physicians to your next appointment.     Please bring all medicines, vitamins, and herbal supplements with you when you come to the office.     Prescriptions will not be filled unless you are compliant with your follow up appointments or have a follow up appointment scheduled as per instruction of your physician. Refills should be requested at the time of your visit.  2 month follow up.

## 2025-02-06 NOTE — PROGRESS NOTES
CARDIOLOGY OFFICE VISIT      CHIEF COMPLAINT  Chief Complaint   Patient presents with    Follow-up     Patient is present to follow up after testing.         HISTORY OF PRESENT ILLNESS    Patient is a 54-year-old  male with past medical history significant for hyperlipidemia, diabetes mellitus who presents for cardiac evaluation.     Patient denies chest pain, dyspnea, palpitations, nausea, vomiting, dizziness, fever, chill, bleeding.  At last visit patient had an episode of chest pain  that woke him from sleep.  He reports that he has been told that he snores heavily and has had witnessed apneic episodes.     Past surgical history:  Vasectomy  Bilateral eye surgery     Social history:  Lifetime non-smoker  Drinks alcohol 4 times a week, 3 beverages beer or liquor     Family history:  Mother  74 due to COVID, history of coronary disease, myocardial infarction  Father  74 COVID  Brother history of coronary PCI 42, CABG at 48     Review of systems have been reviewed and are negative, noncontributory, as previously mentioned x 12 systems.     I personally reviewed EKG 2024: Normal sinus rhythm, borderline left ventricular hypertrophy, possible age-indeterminate anterior septal myocardial infarction     Recommendations:  Coronary artery disease-mild on CCTA 2025, CAC 11, 51st percentile  Left ventricular hypertrophy septum 1.2 cm/LV outflow tract with Valsalva peak gradient 31 mmHg  Hyperlipidemia  Diabetes mellitus  Apnea/snoring-possible obstructive sleep apnea     Recommendations:  To further evaluate witnessed apneic episodes obtain home sleep study.  Patient has LVH.  We discussed possibility of having subclinical hypertension.  I have asked  to maintain a blood pressure diary.  Advise conservative medical therapy for findings on cardiac CTA.  Continue aspirin and statin.  Follow-up in 2 months to reassess blood pressure control and sleep study results.      Please excuse any errors in grammar or translation related to this dictation.  Voice recognition software was utilized to prepare this document.    Recent Cardiovascular Testing:  The following results have been reviewed and updated.     CT Heart Calcium score 2/27/20  1.CORONARY  calcium score is zero.     Stress tests  11/15/18  1.Normal  2.EF 65%  3.10 METS    CT Angio Coronary v1/2/25  1.Mild diffuse CAD  2. Coronary artery calcium score is 11.    Echo 12/23/247  EF 55%  Mild LVH  Mild TR and MR  LVOT 1.3m/sPPG 7mmHg,MPG 4mmHg  LVOT w/ valsalva 2.8m/s, PPG 31mmHg, MPG 15mmHg       VITALS  Vitals:    02/06/25 0909   BP: 128/72   Pulse: 80     Wt Readings from Last 4 Encounters:   01/22/25 96.3 kg (212 lb 6.4 oz)   01/16/25 101 kg (222 lb 3.2 oz)   12/02/24 101 kg (223 lb)   11/22/24 101 kg (223 lb)       PHYSICAL EXAM:  GENERAL:  Well developed, well nourished, in no acute distress.  CHEST:  Symmetric and nontender.  NEURO/PSYCH:  Alert and oriented times three with approppriate behavior and responses.  NECK:  Supple, no JVD, no bruit.  LUNGS:  Clear to auscultation bilaterally, normal respiratory effort.  HEART:  Rate and rhythm REGULAR with no evident murmur, no gallop appreciated.    There are no rubs, clicks or heaves.  EXTREMITIES:  Warm with good color, no clubbing or cyanosis.  There is no edema noted.  PERIPHERAL VASCULAR:  Pulses present and equally palpable; 2+ throughout.    ASSESSMENT AND PLAN  Problem List Items Addressed This Visit          Cardiac and Vasculature    Dyslipidemia    Abnormal EKG    Mild left ventricular hypertrophy    Relevant Orders    Home sleep apnea test (HSAT)       Endocrine/Metabolic    BMI 32.0-32.9,adult    Controlled type 2 diabetes mellitus without complication, without long-term current use of insulin (Multi)       Tobacco    Never smoked tobacco     Other Visit Diagnoses       Sleep disturbance        Relevant Orders    Home sleep apnea test (HSAT)            Past  "Medical History  Past Medical History:   Diagnosis Date    Diabetes mellitus (Multi)     Personal history of other endocrine, nutritional and metabolic disease     History of diabetes mellitus       Social History  Social History     Tobacco Use    Smoking status: Never    Smokeless tobacco: Never   Vaping Use    Vaping status: Never Used   Substance Use Topics    Alcohol use: Yes     Alcohol/week: 10.0 standard drinks of alcohol     Types: 5 Cans of beer, 5 Shots of liquor per week     Comment: social    Drug use: Never       Family History     Family History   Problem Relation Name Age of Onset    Heart attack Mother Pauline     Heart disease Mother Pauline     Other (enlarged heart) Mother Pauline     Pulmonary fibrosis Mother Pauline         Allergies:  No Known Allergies     Outpatient Medications:  Current Outpatient Medications   Medication Instructions    aspirin 81 mg EC tablet 1 tablet, Daily    atenolol (Tenormin) 100 mg tablet Take one tablet two hours before CT    atorvastatin (LIPITOR) 20 mg, oral, Daily    cyclobenzaprine (Flexeril) 10 mg tablet Take 1/2 tablet to 1 tablet nightly as needed for back pain at bedtime.    Dexcom G7  misc USE AS INSTRUCTED    Dexcom G7 Sensor device USE AS INSTRUCTED, REPLACE EVERY 10 DAYS    empagliflozin-metformin (Synjardy) 12.5-500 mg 1 tablet, oral, 2 times daily    FreeStyle Latia sensor system (FreeStyle Latia 2 Sensor) kit Tid testing    multivitamin tablet 1 tablet, Daily    sildenafil (Viagra) 50 mg tablet Take by mouth.    Trulicity 3 mg, subcutaneous, Once Weekly        Recent Lab Results:    CMP:    Lab Results   Component Value Date     11/22/2024    K 4.1 11/22/2024     11/22/2024    CO2 25 11/22/2024    BUN 24 (H) 11/22/2024    CREATININE 1.13 11/22/2024    GLUCOSE 180 (H) 11/22/2024    CALCIUM 9.4 11/22/2024       Magnesium:    No results found for: \"MG\"    Lipid Profile:    Lab Results   Component Value Date    TRIG 148 04/10/2024    HDL 43.2 " "04/10/2024    LDLCALC 101 (H) 04/10/2024       TSH:    No results found for: \"TSH\"    BNP:   Lab Results   Component Value Date    BNP 6 11/22/2024        PT/INR:    Lab Results   Component Value Date    PROTIME 11.2 11/22/2024    INR 1.0 11/22/2024       HgBA1c:    Lab Results   Component Value Date    HGBA1C 9.0 (A) 01/16/2025       BMP:  Lab Results   Component Value Date     11/22/2024     04/10/2024     02/07/2022     03/05/2021     (L) 10/02/2020    K 4.1 11/22/2024    K 5.5 (H) 04/10/2024    K 4.4 02/07/2022    K 4.2 03/05/2021    K 4.0 10/02/2020     11/22/2024     04/10/2024     02/07/2022     03/05/2021     10/02/2020    CO2 25 11/22/2024    CO2 28 04/10/2024    CO2 28 02/07/2022    CO2 27 03/05/2021    CO2 23 10/02/2020    BUN 24 (H) 11/22/2024    BUN 20 04/10/2024    BUN 16 02/07/2022    BUN 18 03/05/2021    BUN 17 10/02/2020    CREATININE 1.13 11/22/2024    CREATININE 1.18 04/10/2024    CREATININE 1.15 02/07/2022    CREATININE 1.16 03/05/2021    CREATININE 1.14 10/02/2020       CBC:  Lab Results   Component Value Date    WBC 7.3 11/22/2024    WBC 7.5 04/10/2024    WBC 6.0 02/07/2022    WBC 6.4 03/05/2021    WBC 4.4 10/02/2020    RBC 5.09 11/22/2024    RBC 5.58 04/10/2024    RBC 5.66 02/07/2022    RBC 5.68 03/05/2021    RBC 5.35 10/02/2020    HGB 15.3 11/22/2024    HGB 16.9 04/10/2024    HGB 16.0 02/07/2022    HGB 16.3 03/05/2021    HGB 15.2 10/02/2020    HCT 42.9 11/22/2024    HCT 48.3 04/10/2024    HCT 48.5 02/07/2022    HCT 47.1 03/05/2021    HCT 42.2 10/02/2020    MCV 84 11/22/2024    MCV 87 04/10/2024    MCV 86 02/07/2022    MCV 83 03/05/2021    MCV 79 (L) 10/02/2020    MCH 30.1 11/22/2024    MCH 30.3 04/10/2024    MCHC 35.7 11/22/2024    MCHC 35.0 04/10/2024    MCHC 33.0 02/07/2022    MCHC 34.6 03/05/2021    MCHC 36.0 10/02/2020    RDW 11.9 11/22/2024    RDW 11.9 04/10/2024    RDW 12.3 02/07/2022    RDW 12.4 03/05/2021    RDW 12.2 " 10/02/2020     11/22/2024     04/10/2024     02/07/2022     03/05/2021     (L) 10/02/2020       Cardiac Enzymes:    Lab Results   Component Value Date    TROPHS <3 11/22/2024    TROPHS <3 11/22/2024       Hepatic Function Panel:    Lab Results   Component Value Date    ALKPHOS 85 11/22/2024    ALT 18 11/22/2024    AST 14 11/22/2024    PROT 6.8 11/22/2024    BILITOT 0.6 11/22/2024           Sergo Phan DO

## 2025-02-11 ENCOUNTER — TELEPHONE (OUTPATIENT)
Dept: PRIMARY CARE | Facility: CLINIC | Age: 55
End: 2025-02-11
Payer: COMMERCIAL

## 2025-02-11 DIAGNOSIS — G89.29 CHRONIC LOW BACK PAIN, UNSPECIFIED BACK PAIN LATERALITY, UNSPECIFIED WHETHER SCIATICA PRESENT: ICD-10-CM

## 2025-02-11 DIAGNOSIS — M25.552 HIP PAIN, LEFT: Primary | ICD-10-CM

## 2025-02-11 DIAGNOSIS — M54.50 CHRONIC LOW BACK PAIN, UNSPECIFIED BACK PAIN LATERALITY, UNSPECIFIED WHETHER SCIATICA PRESENT: ICD-10-CM

## 2025-02-11 NOTE — TELEPHONE ENCOUNTER
"PLEASE ADVISE-- AWARE YOU ARE OUT OF THE OFFICE TODAY    Sergo Cordero \"Franko\"  [id number] [id number] Clinical Support Staff (supporting Jesús Randhawa DO)34 minutes ago (1:02 PM)     MC  Hello,  I am asking if Dr. Randhawa can give me a referral to an ortho doctor for my hip pain.  It has gotten to the point where it interferes with my sleep and I cannot sit for any longer length of time without pain when standing up and starting to walk.   "

## 2025-02-12 NOTE — TELEPHONE ENCOUNTER
Jesús Randhawa, DO [id number] minutes ago (8:46 AM)       Refer to Dr. Knight   Pt is aware and scheduled

## 2025-02-13 ENCOUNTER — CLINICAL SUPPORT (OUTPATIENT)
Dept: SLEEP MEDICINE | Facility: HOSPITAL | Age: 55
End: 2025-02-13
Payer: COMMERCIAL

## 2025-02-13 DIAGNOSIS — G47.9 SLEEP DISTURBANCE: ICD-10-CM

## 2025-02-13 DIAGNOSIS — I51.7 MILD LEFT VENTRICULAR HYPERTROPHY: ICD-10-CM

## 2025-02-13 NOTE — PROGRESS NOTES
Type of Study: HOME SLEEP STUDY - NOMAD     The patient received equipment and instructions for use of the [facility] Nomad HSAT 4016 device. The patient was instructed how to apply the effort belts, cannula, thermistor. It was also explained how the Nomad and oximeter components work.  The patient was asked to record their sleep for an 8-hour period.     The patient was informed of their responsibility for the device and acknowledged this by signing the HSAT device contract. The patient was asked to return the device on 2/14/2025 between the hours of 9am to the [facility].     The patient was instructed to call 911 as usual for any medical- emergencies while at home.  The patient was also given a phone number for troubleshooting when using the device in case there were additional questions.

## 2025-02-14 DIAGNOSIS — E78.5 DYSLIPIDEMIA: ICD-10-CM

## 2025-02-14 DIAGNOSIS — E11.9 TYPE 2 DIABETES MELLITUS WITHOUT COMPLICATION, WITHOUT LONG-TERM CURRENT USE OF INSULIN (MULTI): ICD-10-CM

## 2025-02-14 RX ORDER — ATORVASTATIN CALCIUM 20 MG/1
20 TABLET, FILM COATED ORAL DAILY
Qty: 90 TABLET | Refills: 1 | Status: SHIPPED | OUTPATIENT
Start: 2025-02-14

## 2025-02-18 ENCOUNTER — HOSPITAL ENCOUNTER (OUTPATIENT)
Dept: RADIOLOGY | Facility: CLINIC | Age: 55
Discharge: HOME | End: 2025-02-18
Payer: COMMERCIAL

## 2025-02-18 ENCOUNTER — OFFICE VISIT (OUTPATIENT)
Dept: ORTHOPEDIC SURGERY | Facility: CLINIC | Age: 55
End: 2025-02-18
Payer: COMMERCIAL

## 2025-02-18 DIAGNOSIS — M25.552 HIP PAIN, LEFT: ICD-10-CM

## 2025-02-18 DIAGNOSIS — G89.29 CHRONIC LOW BACK PAIN, UNSPECIFIED BACK PAIN LATERALITY, UNSPECIFIED WHETHER SCIATICA PRESENT: ICD-10-CM

## 2025-02-18 DIAGNOSIS — M54.50 CHRONIC LOW BACK PAIN, UNSPECIFIED BACK PAIN LATERALITY, UNSPECIFIED WHETHER SCIATICA PRESENT: ICD-10-CM

## 2025-02-18 DIAGNOSIS — M53.3 SI (SACROILIAC) JOINT DYSFUNCTION: Primary | ICD-10-CM

## 2025-02-18 PROCEDURE — 99213 OFFICE O/P EST LOW 20 MIN: CPT | Performed by: ORTHOPAEDIC SURGERY

## 2025-02-18 PROCEDURE — 73502 X-RAY EXAM HIP UNI 2-3 VIEWS: CPT | Mod: LT

## 2025-02-18 PROCEDURE — 99204 OFFICE O/P NEW MOD 45 MIN: CPT | Performed by: ORTHOPAEDIC SURGERY

## 2025-02-18 PROCEDURE — 1036F TOBACCO NON-USER: CPT | Performed by: ORTHOPAEDIC SURGERY

## 2025-02-18 RX ORDER — METHYLPREDNISOLONE 4 MG/1
TABLET ORAL
Qty: 21 TABLET | Refills: 0 | Status: SHIPPED | OUTPATIENT
Start: 2025-02-18

## 2025-02-18 NOTE — PROGRESS NOTES
"  Subjective    Patient ID: Sergo \"Franko\"    Chief Complaint:   Chief Complaint   Patient presents with    Left Hip - New Patient Visit, Pain     54-year-old gentleman presented clinic today for new patient evaluation with Dr. Knight with complaints of low back pain and bilateral hip pain.  He states that back in 2005 he was diagnosed with having herniated disc at L4-L5 he was able to rehab this after 3 months and felt significant relief.  He states over the last year year and a half he has had bilateral lateral hip pain left worse than right.  He has difficulty getting up from a seated position due to stiffness and discomfort but once he gets settled he is able to walk without any problems.  No groin pain present.  No instability.  He still gets mild numbness over the anterior thighs of both lower extremities.  He is tried over-the-counter medications with minimal relief.  He was referred here by his primary care physician.  He takes muscle relaxants at times for muscle spasms lumbar region.         Past medical history        The patient's past medical history, family history, social history, and review of systems were documented on the patient's medical intake form.  The medical intake form was reviewed and scanned into the electronic medical record for future use.  History is otherwise negative except as stated in the HPI.     Physical exam     General: Alert and oriented to place, person, and time.  No acute distress and breathing comfortably; pleasant and cooperative with the examination.  HEENT: Head is normocephalic and atraumatic.  Neck: Supple, no visible swelling.  Cardiovascular: Good perfusion to the affected extremity.  Lungs: No audible wheezing or labored breathing.  Abdomen: Nondistended     Extremity:  Both hips neurovascular intact.  Has good range of motion bilaterally.  No groin pain.  Negative straight leg raise testing.  Mild tenderness palpation over the left SI joint.  Mild tenderness " over the left piriformis.  No instability.     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  At this time with a long discussion regards to continued conservative management.  2.  He wishes to proceed with outpatient physical therapy which seemed of helped in the past.  Will get him set up for a pelvic stability core strength program for SI joint dysfunction.  Prescription Medrol Dosepak sent to the pharmacy he has been on these before which do help  3.  He will continue with weightbearing activity as tolerated.  He will follow-up with Dr. Craig and his orthopedic spine team if he continues to have issues.  For complete plan and/or surgical details, please refer to Dr. Kngiht's portion of the split/shared dictation.  4.  All of the patient's questions were answered.     This note was prepared using voice recognition software.  The details of this note are correct and have been reviewed, and corrected to the best of my ability.  Some grammatical areas may persist related to the Dragon software     Rancho Latham PA-C, [facility]  [facility] [place]     [phone]    In a face-to-face encounter performed today, I Osmar Knight MD performed a history and physical examination, discussed pertinent diagnostic studies if indicated, and discussed diagnosis and management strategies with both the patient and the midlevel provider.  I reviewed the midlevel's note and agree with the documented findings and plan of care.  Greater than 50% of the evaluation and treatment decision was performed by the physician myself during today's visit.    New patient to me 54-year-old gentleman presents complaining of pain in both hips in his low back left greater than right he denies any groin pain he states he has occasional numbness and tingling into his thighs he has a  history of a herniated disc L4-L5 in the remote past.  On examination he has good range of motion both hip no pain with internal extra rotation good muscle strength distally brisk cap refill compartments are soft calf is nontender x-rays are obtained today see my dictated x-ray report.  Impression is bilateral hip and leg pain likely due to lumbar radiculitis.  I reassured him hip x-rays look okay he does not have severe hip arthritis and think his issue is more coming from his back and pelvis have recommended physical therapy Medrol Dosepak and follow-up with the back specialist.      Patient has severe impairment related to her presenting condition.  It is significantly impairing bodily function.  We did discuss surgical and nonsurgical options.    This note was prepared using voice recognition software.  The details of this note are correct and have been reviewed, and corrected to the best of my ability.  Some grammatical areas may persist related to the Dragon software    Osmar Knight MD  Senior Attending Physician  [facility]    [phone]

## 2025-02-24 PROCEDURE — RXMED WILLOW AMBULATORY MEDICATION CHARGE

## 2025-02-27 ENCOUNTER — PHARMACY VISIT (OUTPATIENT)
Dept: PHARMACY | Facility: CLINIC | Age: 55
End: 2025-02-27
Payer: MEDICARE

## 2025-03-03 DIAGNOSIS — E11.9 TYPE 2 DIABETES MELLITUS WITHOUT COMPLICATIONS (MULTI): ICD-10-CM

## 2025-03-03 RX ORDER — BLOOD-GLUCOSE SENSOR
EACH MISCELLANEOUS
Qty: 4 EACH | Refills: 2 | Status: SHIPPED | OUTPATIENT
Start: 2025-03-03

## 2025-03-09 DIAGNOSIS — M54.50 CHRONIC LOW BACK PAIN, UNSPECIFIED BACK PAIN LATERALITY, UNSPECIFIED WHETHER SCIATICA PRESENT: ICD-10-CM

## 2025-03-09 DIAGNOSIS — G89.29 CHRONIC LOW BACK PAIN, UNSPECIFIED BACK PAIN LATERALITY, UNSPECIFIED WHETHER SCIATICA PRESENT: ICD-10-CM

## 2025-03-10 RX ORDER — CYCLOBENZAPRINE HCL 10 MG
TABLET ORAL
Qty: 30 TABLET | Refills: 0 | Status: SHIPPED | OUTPATIENT
Start: 2025-03-10

## 2025-03-19 ENCOUNTER — EVALUATION (OUTPATIENT)
Dept: PHYSICAL THERAPY | Facility: CLINIC | Age: 55
End: 2025-03-19
Payer: COMMERCIAL

## 2025-03-19 DIAGNOSIS — M53.3 SI (SACROILIAC) JOINT DYSFUNCTION: ICD-10-CM

## 2025-03-19 DIAGNOSIS — M25.551 GREATER TROCHANTERIC PAIN SYNDROME OF BOTH LOWER EXTREMITIES: ICD-10-CM

## 2025-03-19 DIAGNOSIS — M54.50 LUMBAR SPINE PAIN: Primary | ICD-10-CM

## 2025-03-19 DIAGNOSIS — M25.552 GREATER TROCHANTERIC PAIN SYNDROME OF BOTH LOWER EXTREMITIES: ICD-10-CM

## 2025-03-19 PROCEDURE — 97162 PT EVAL MOD COMPLEX 30 MIN: CPT | Mod: GP

## 2025-03-19 PROCEDURE — 97110 THERAPEUTIC EXERCISES: CPT | Mod: GP

## 2025-03-19 PROCEDURE — 97112 NEUROMUSCULAR REEDUCATION: CPT | Mod: GP

## 2025-03-19 NOTE — PROGRESS NOTES
"Physical Therapy    Physical Therapy Evaluation    Patient Name: Sergo Cordero \"Rudy"  MRN: [id number]  Today's Date: 3/19/2025   confirmed verbally by patient.    Time Entry:   Time Calculation  Start Time: 1606  Stop Time: 1659  Time Calculation (min): 53 min  PT Evaluation Time Entry  PT Evaluation (Moderate) Time Entry: 30  PT Therapeutic Procedures Time Entry  Neuromuscular Re-Education Time Entry: 15  Therapeutic Exercise Time Entry: 8                    Reason for Visit  Referred by: Osmar Knight   Referral DX:  SI (sacroiliac) joint dysfunction [M53.3]     Insurance:  Visit: #1  Authorized: to be determined  Payor/Plan: [employer] / AETNA [facility]       Current Problem  1. Lumbar spine pain  Follow Up In Physical Therapy      2. SI (sacroiliac) joint dysfunction  Referral to Physical Therapy      3. Greater trochanteric pain syndrome of both lower extremities  Follow Up In Physical Therapy          Subjective   Date of Onset:  (chronic back pain)   Chief Complaint: Lumbosacral and bilateral hip pain     The patient presents to physical therapy with complaints of midline lumbar spine pain and bilateral hip pain. Over the past few months, the patient has noticed a worsening of symptoms, initially localized to the mid lumbar spine but now radiating into both hips, with the pain specifically concentrated in the greater trochanteric region. The patient also reports right lower extremity paresthesia, which appears to be unpredictable in nature. The symptoms are most commonly aggravated by prolonged sitting and standing postures. The patient has a history of prior physical therapy episodes addressing lumbosacral and right lumbar dysfunction, with positive outcomes from previous treatment interventions. To date, the patient has not received treatment for the current symptoms other than the use of a warm pack and anti-inflammatory medication. The patient is highly motivated to participate in " "physical therapy with the goal of returning to a prior level of function and increasing physical activity.          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 = NO  Changes in bowel/bladder? = NO  Recent weight loss not purposeful? = NO  Bilateral numbness/tingling? = NO       Pain:  Worst = 7/10   \"Can still function\" but would still be limiting patient functional ability / tolerance to movement   Worst when sleeping - full extension / neutral posture in supine     Best = 0/10   Most often it remains a 2/10     Pain described as a dull, achiness sensation to the lateral hips and a tightness and band-like sharp pain to the lumbosacral spine           Recent Imaging:  XR of left hip (2/18/25)  AP pelvis and lateral view of the left hip. Mild bilateral hip joint space narrowing small calcification along the greater trochanter of the left hip no evidence of fracture dislocation or other bony abnormality      Reviewed medical screening history form with patient: Yes,        Barriers Impacting Care:  none    Precautions:   none        Objective   Observation of Functional Movements   Squat Performance: Patient is able to perform a full-depth squat; however, increased lumbar spine pain is noted when reaching the lowest depth of the squat.       Range of Motion (ROM):  Lumbar Flexion: Reproduces left-sided lumbar spine pain during both the descent and return to upright standing. Notable flat spine observed in lumbar region with increased motion at the mid to upper thoracic spine.    Lumbar Extension: No reproduction of symptoms with proper hinging at the " "lumbar spine. Movement is performed without compensatory anterior hip translation.    Side Bending: Bilateral fingers to joint line. Increased compression sensation in lumbar spine when side bending to the left. No symptom reproduction during movement.    Hip ROM: No reproduction of lumbar spine symptoms when testing hip range of motion into flexion, external rotation, or internal rotation in supine.  Flexion = WNL with soft end-feel bilat  External rotation = WNL with muscular end-feel bilat  Internal rotation = WNL with firm end-feel bilat       FLEXIBILITY   Hamstring Flexibility: Limited bilaterally, with the patient able to achieve 80% of full motion.    Hip Extension Flexibility: Limited, with a positive Maury's test indicated by compensatory anterior pelvic tilt during passive assessment in sidelying       Myotomes   L1-2 (Hip Flexion) =   (L) =  4+/5   (R) =  4+/5   L3-4 (Knee Extension) =   (L) =  4+/5   (R) =  4+/5   L4 (Ankle DF) =   (L) =  4+/5   (R) =  4+/5   L5 (Great toe extension) =   (L) =  4+/5   (R) =  4+/5   S1 (Ankle PF) =   (L) =  4+/5   (R) =  4+/5   S2 (Knee Flexion) =   (L) =  4+/5   (R) =  4+/5     DTR (L/R)  Patellar L4: 2+/2+  Achilles S1: unable to elicit    Dermatomes   intact BLE       Palpation:   TTP   L = greater trochanter, gluteal tendon insertion at greater trochanter, ITB, TFL mm belly   R = TFL mm belly   Lumbar spine = L4-5 TP, QL bilat        Special Tests:  Lumbar spine   Modified Prone Instability Test: Reduction in left-sided lumbar spine pain observed with active hip extension in the prone position.  (-) SLR bilaterally     Sacral Spine  (-) SIJ compression   (-) SIJ distraction   (-) SIJ thrust   Symmetrical PSIS alignment with forward bending     Hip   (-) IVY bilat   (-) FADIR   (-) SCOUR         Joint mobility testing (normal unless otherwise noted below)  L1 = 1+  L2 = 1+  L3 = 1+  L4 = 1+  L5 = 1+          \"Key Sign\" = forward lumbar flexion AROM "             TREATMENT  Patient education   Educated patient on course of treatment.   Findings during objective assessment and how they correlate with patient's symptoms   Lumbar instability and its role with the proximal hip and deep abdominal musculature     Neuromuscular Re-education = 1 unit  Sidelying hip abduction   Dead bug with horizontal adduction isometric (anterior oblique sling focus)   Standing kickstand palloff press (ipsilateral limb in front)       OP Education: HEP: Patient verbalizes and demonstrates understanding of home exercises. Will contact writer if with questions or if condition worsens. Discussed       Outcome Measures:  FAM = 70% disability   PHQ-9 = 0          Assessment  PT Diagnosis: Patient presents with lumbosacral and bilateral hip pain secondary to lumbar instability as evidenced by a positive prone instability test (modified), negative SLR, and aberrant motion when returning from a flexed posture in standing. Patient tests negative for significant findings during neurological examination and ROM screening of the hips. Lateral weakness of the hips evidenced by compensatory posterior pelvic tilt and increased TFL activity during gluteus medius testing, coinciding with location of patient's lateral hip pain with palpation. Patient has positive response to the initial introduction of interventions in session, reporting no pain to the lumbosacral spine with retesting lumbar flexion AROM at the end of the session. Patient will have a good prognosis for a positive response with physical therapy based on previous positive response to physical therapy interventions, low-mild pain levels, good base level of function/strength/ROM, and positive response in session to initial interventions. Skilled PT required to return to prior level of function and maximize functional outcome. Likely to benefit from skilled care.        Plan  Therapeutic exercises to improve thoracolumbar ROM of and strength  of proximal hip and core musculature; neuromuscular re-education to promote proper engagement of proximal hip and core musculature in open and closed chain movements; manual therapy for joint mobility and soft tissue tightness; therapeutic activity to promote return to prior level of function; dry needling; aquatic therapy; cold therapy; Biofeedback; gait training; Electrical stimulation; Patient plan will consist of 2 days/week for 12 weeks.    Next Visit Plan:  Assess response to initial HEP - make modifications if necessary   Continue to improve deep abdominal re-education with extremity movement (open and closed chain)   Address proximal hip musculature impairments       Goals:  Patient will be independent with HEP.  Patient will report improvement in FAM outcome measure <50% disability in 4 weeks.   Patient will report improvement in FAM outcome measure <25% disability in 8 weeks.   Patient will report improvement in FAM outcome measure <15% disability in 12 weeks.   Patient will report reductions in overall pain intensity to less than a five out of 10 in four weeks with sitting, standing, and laying supine with bilateral limbs extended.  Patient will report an increase in sitting and standing tolerance to greater than 60 minutes in four weeks without the reproduction of symptoms.  Patient will report an increase in sitting and standing tolerance of at least 90 minutes without reproduction of symptoms in eight weeks.  Patient will demonstrate at least 4-/5 strength to the gluteus medius in four weeks.  Patient will demonstrate at least 4/5 strength to the gluteus medius in 8 weeks.        Giovanny Powers PT, DPT

## 2025-03-24 DIAGNOSIS — E11.9 CONTROLLED TYPE 2 DIABETES MELLITUS WITHOUT COMPLICATION, WITHOUT LONG-TERM CURRENT USE OF INSULIN: ICD-10-CM

## 2025-03-24 RX ORDER — DULAGLUTIDE 3 MG/.5ML
3 INJECTION, SOLUTION SUBCUTANEOUS
Qty: 6 ML | Refills: 1 | Status: SHIPPED | OUTPATIENT
Start: 2025-03-24

## 2025-03-26 ENCOUNTER — APPOINTMENT (OUTPATIENT)
Dept: PHYSICAL THERAPY | Facility: CLINIC | Age: 55
End: 2025-03-26
Payer: COMMERCIAL

## 2025-03-27 PROCEDURE — RXMED WILLOW AMBULATORY MEDICATION CHARGE

## 2025-03-31 ENCOUNTER — PHARMACY VISIT (OUTPATIENT)
Dept: PHARMACY | Facility: CLINIC | Age: 55
End: 2025-03-31
Payer: MEDICARE

## 2025-04-02 ENCOUNTER — TREATMENT (OUTPATIENT)
Dept: PHYSICAL THERAPY | Facility: CLINIC | Age: 55
End: 2025-04-02
Payer: COMMERCIAL

## 2025-04-02 DIAGNOSIS — M25.551 GREATER TROCHANTERIC PAIN SYNDROME OF BOTH LOWER EXTREMITIES: ICD-10-CM

## 2025-04-02 DIAGNOSIS — M25.552 GREATER TROCHANTERIC PAIN SYNDROME OF BOTH LOWER EXTREMITIES: ICD-10-CM

## 2025-04-02 DIAGNOSIS — M54.50 LUMBAR SPINE PAIN: ICD-10-CM

## 2025-04-02 PROCEDURE — 97140 MANUAL THERAPY 1/> REGIONS: CPT | Mod: GP

## 2025-04-02 PROCEDURE — 97112 NEUROMUSCULAR REEDUCATION: CPT | Mod: GP

## 2025-04-02 NOTE — PROGRESS NOTES
"Physical Therapy    Physical Therapy Treatment    Patient Name: Sergo Cordero \"Franko\"  MRN: [id number]  Today's Date: 4/2/2025  Time Calculation  Start Time: 1658  Stop Time: 1748  Time Calculation (min): 50 min        Insurance:  Visit: #2  Authorized: 20  Certification: 3/19/2025 - 3/19/2026   Payor: [employer]UL[employer]LEY / Plan: AETNA  HEALTHCARE / Product Type: *No Product type* /       Subjective:  Patient reports to physical therapy with no worsening of symptoms since the initial evaluation, denies any paresthesia down either LE over the last two weeks. He notes that there is more localized discomfort to the left hip and lower back but no more than what he was previously experiencing. Patient has been compliant with current HEP at home prior to returning in session.         Current pain: 1-2/10        Objective:  Lumbar AROM   Flexion = WNL without symptoms     Squat   Full depth squat without reproduction of symptoms   Proper form and control throughout motion         Treatment:  Manual Therapy = 1 unit  Trigger point release  (L) iliacus localized to lateral ilium with passive hip flex/ext   (L) TFL in sidelying with passive hip flex/ER   ART   (L) glute med with sidelying clam shells x10   Lumbar mobilization   Grade 3 PA L2-5  MWM grade 3 PA to L2-5 (5x at each segment)     Neuromuscular Re-education = 2 units  Review of HEP   Supine dead bug   Rhythmic stabilization to UE using RTB = 2x5x10\" holds   Alternating isometrics into hip add/abd = 2x5x10\"   Sidelying hip abduction   4 sets of 3 isotonic reps followed by 3 second hold (2x through = 1 round)  Single leg palloff press isometric   Sets 2-3 front foot on airex foam pad = 4x5x10\" holds (7.5#)           HEP: #1 (Access Code: [email or web address])  - Supine Knee to Chest with Leg Straight  - 1 x daily - 7 x weekly - 4 sets - 5 reps - 10 seconds hold  - Sidelying Hip Abduction  - 1 x daily - 7 x weekly - 4 sets - 5 reps - 5-10 second hold  - Single-Leg Anti-Rotation Press With " Anchored Resistance  - 1 x daily - 7 x weekly - 3 sets - 5 reps - 10 seconds hold          Diagnosis:  1. Greater trochanteric pain syndrome of both lower extremities    2. Lumbar spine pain          Assessment:   Pt demonstrates tolerance to all interventions performed in session, beginning with manual therapy to address soft tissue sensitivity to the TFL (L) and lack of lumbar mobility into extension. Review of current HEP performed in session, with slight modification to sidelying hip abduction incorporating isotonic repetitions and isometric holds using manual pressure to facilitate proper muscle activation and movement patterning. Frequent verbal cueing for patient hip and trunk position initially required during single leg palloff isometrics, but patient becomes independent at the end of the session self correcting biomechanical compensations. Patient will continue to benefit from physical therapy services in order to return to prior level of function without compensatory motions during active movements.          Plan:  Assess response to HEP - make modifications if necessary   Continue to improve deep abdominal re-education with extremity movement (open and closed chain)   Address proximal hip musculature impairments      Giovanny Powers, PT

## 2025-04-09 ENCOUNTER — APPOINTMENT (OUTPATIENT)
Dept: PHYSICAL THERAPY | Facility: CLINIC | Age: 55
End: 2025-04-09
Payer: COMMERCIAL

## 2025-04-10 ENCOUNTER — APPOINTMENT (OUTPATIENT)
Dept: CARDIOLOGY | Facility: CLINIC | Age: 55
End: 2025-04-10
Payer: COMMERCIAL

## 2025-04-10 VITALS
DIASTOLIC BLOOD PRESSURE: 70 MMHG | HEART RATE: 72 BPM | WEIGHT: 209.3 LBS | SYSTOLIC BLOOD PRESSURE: 120 MMHG | BODY MASS INDEX: 30.91 KG/M2

## 2025-04-10 DIAGNOSIS — G47.33 OSA (OBSTRUCTIVE SLEEP APNEA): ICD-10-CM

## 2025-04-10 DIAGNOSIS — E11.9 CONTROLLED TYPE 2 DIABETES MELLITUS WITHOUT COMPLICATION, WITHOUT LONG-TERM CURRENT USE OF INSULIN: ICD-10-CM

## 2025-04-10 DIAGNOSIS — I51.7 MILD LEFT VENTRICULAR HYPERTROPHY: ICD-10-CM

## 2025-04-10 DIAGNOSIS — Z78.9 NEVER SMOKED TOBACCO: ICD-10-CM

## 2025-04-10 DIAGNOSIS — E78.5 DYSLIPIDEMIA: ICD-10-CM

## 2025-04-10 DIAGNOSIS — R94.31 ABNORMAL EKG: ICD-10-CM

## 2025-04-10 PROCEDURE — 3074F SYST BP LT 130 MM HG: CPT | Performed by: INTERNAL MEDICINE

## 2025-04-10 PROCEDURE — 1036F TOBACCO NON-USER: CPT | Performed by: INTERNAL MEDICINE

## 2025-04-10 PROCEDURE — 99214 OFFICE O/P EST MOD 30 MIN: CPT | Performed by: INTERNAL MEDICINE

## 2025-04-10 PROCEDURE — 3078F DIAST BP <80 MM HG: CPT | Performed by: INTERNAL MEDICINE

## 2025-04-10 PROCEDURE — 3052F HG A1C>EQUAL 8.0%<EQUAL 9.0%: CPT | Performed by: INTERNAL MEDICINE

## 2025-04-10 NOTE — PROGRESS NOTES
CARDIOLOGY OFFICE VISIT      CHIEF COMPLAINT  Chief Complaint   Patient presents with    Follow-up     2 month follow-up for management of ventricular hypertrophy & sleep disturbance.  He is here to discuss results of sleep study      Abnormal sleep study    HISTORY OF PRESENT ILLNESS    Patient is a 54-year-old  male with past medical history significant for hyperlipidemia, diabetes mellitus who presents for cardiac evaluation.     Patient denies chest pain, dyspnea, palpitations, nausea, vomiting, dizziness, fever, chill, bleeding.  Patient walks daily for 30 minutes.  Home blood pressure readings are under good control.  Sleep study revealed moderate obstructive sleep apnea.     Past surgical history:  Vasectomy  Bilateral eye surgery     Social history:  Lifetime non-smoker  Drinks alcohol 4 times a week, 3 beverages beer or liquor     Family history:  Mother  74 due to COVID, history of coronary disease, myocardial infarction  Father  74 COVID  Brother history of coronary PCI 42, CABG at 48     Review of systems have been reviewed and are negative, noncontributory, as previously mentioned x 12 systems.     I personally reviewed EKG 2024: Normal sinus rhythm, borderline left ventricular hypertrophy, possible age-indeterminate anterior septal myocardial infarction     Recommendations:  Coronary artery disease-mild on CCTA 2025, CAC 11, 51st percentile  Left ventricular hypertrophy septum 1.2 cm/LV outflow tract with Valsalva peak gradient 31 mmHg  Hyperlipidemia  Diabetes mellitus  Obstructive sleep apnea     Recommendations:  Patient appears to be stable from a cardiac standpoint.  No symptoms of angina and no severe obstructive coronary artery disease on cardiac CTA.  No symptomatic heart failure symptoms.  No symptomatic arrhythmia.  Blood pressure under good control.  Refer to pulmonary for formal treatment of obstructive sleep apnea.  Advise diet, weight loss,  aerobic exercise program 30 minutes/day.  Follow-up in 1 year.  Check CMP, lipid profile at that time.    Please excuse any errors in grammar or translation related to this dictation.  Voice recognition software was utilized to prepare this document.    Recent Cardiovascular Testing:  The following results have been reviewed and updated.     CT Heart Calcium score 2/27/20  1.CORONARY  calcium score is zero.    Home Sleep Study 2/18/258  1.Moderate ALECIA  Sp02 ralf 85.0%     Stress tests  11/15/18  1.Normal  2.EF 65%  3.10 METS     CT Angio Coronary v1/2/25  1.Mild diffuse CAD  2. Coronary artery calcium score is 11.     Echo 12/23/247  EF 55%  Mild LVH  Mild TR and MR  LVOT 1.3m/sPPG 7mmHg,MPG 4mmHg  LVOT w/ valsalva 2.8m/s, PPG 31mmHg, MPG 15mmHg       VITALS  Vitals:    04/10/25 0900   BP: 120/70   Pulse: 72     Wt Readings from Last 4 Encounters:   04/10/25 94.9 kg (209 lb 4.8 oz)   02/06/25 98.4 kg (217 lb)   01/22/25 96.3 kg (212 lb 6.4 oz)   01/16/25 101 kg (222 lb 3.2 oz)       PHYSICAL EXAM:  GENERAL:  Well developed, well nourished, in no acute distress.  CHEST:  Symmetric and nontender.  NEURO/PSYCH:  Alert and oriented times three with approppriate behavior and responses.  NECK:  Supple, no JVD, no bruit.  LUNGS:  Clear to auscultation bilaterally, normal respiratory effort.  HEART:  Rate and rhythm regular with no evident murmur, no gallop appreciated.                   There are no rubs, clicks or heaves.  EXTREMITIES:  Warm with good color, no clubbing or cyanosis.  There is no edema noted.  PERIPHERAL VASCULAR:  Pulses present and equally palpable; 2+ throughout.    ASSESSMENT AND PLAN  Problem List Items Addressed This Visit          Cardiac and Vasculature    Dyslipidemia    Relevant Orders    Lipid panel    Abnormal EKG    Relevant Orders    Comprehensive metabolic panel    Mild left ventricular hypertrophy    Relevant Orders    Comprehensive metabolic panel       Endocrine/Metabolic    BMI  30.0-30.9,adult    Controlled type 2 diabetes mellitus without complication, without long-term current use of insulin       Sleep    ALECIA (obstructive sleep apnea)    Relevant Orders    Referral to Pulmonology    Comprehensive metabolic panel       Tobacco    Never smoked tobacco       Past Medical History  Past Medical History:   Diagnosis Date    Diabetes mellitus (Multi)     Personal history of other endocrine, nutritional and metabolic disease     History of diabetes mellitus       Social History  Social History     Tobacco Use    Smoking status: Never    Smokeless tobacco: Never   Vaping Use    Vaping status: Never Used   Substance Use Topics    Alcohol use: Yes     Alcohol/week: 10.0 standard drinks of alcohol     Types: 5 Cans of beer, 5 Shots of liquor per week     Comment: social    Drug use: Never       Family History     Family History   Problem Relation Name Age of Onset    Heart attack Mother Pauline     Heart disease Mother Pauline     Other (enlarged heart) Mother Pauline     Pulmonary fibrosis Mother Pauline         Allergies:  No Known Allergies     Outpatient Medications:  Current Outpatient Medications   Medication Instructions    aspirin 81 mg EC tablet 1 tablet, Daily    atorvastatin (LIPITOR) 20 mg, oral, Daily    cyclobenzaprine (Flexeril) 10 mg tablet TAKE 1/2 TABLET TO 1 TABLET NIGHTLY AS NEEDED FOR BACK PAIN AT BEDTIME.    Dexcom G7  misc USE AS INSTRUCTED    Dexcom G7 Sensor device USE AS INSTRUCTED, REPLACE EVERY 10 DAYS    empagliflozin-metformin (Synjardy) 12.5-500 mg 1 tablet, oral, 2 times daily    FreeStyle Latia sensor system (FreeStyle Latia 2 Sensor) kit Tid testing    methylPREDNISolone (Medrol Dospak) 4 mg tablets Use as directed by package instructions    multivitamin tablet 1 tablet, Daily    sildenafil (Viagra) 50 mg tablet Take by mouth.    Trulicity 3 mg, subcutaneous, Once Weekly        Recent Lab Results:    CMP:    Lab Results   Component Value Date     11/22/2024    K  "4.1 11/22/2024     11/22/2024    CO2 25 11/22/2024    BUN 24 (H) 11/22/2024    CREATININE 1.13 11/22/2024    GLUCOSE 180 (H) 11/22/2024    CALCIUM 9.4 11/22/2024       Magnesium:    No results found for: \"MG\"    Lipid Profile:    Lab Results   Component Value Date    TRIG 148 04/10/2024    HDL 43.2 04/10/2024    LDLCALC 101 (H) 04/10/2024       TSH:    No results found for: \"TSH\"    BNP:   Lab Results   Component Value Date    BNP 6 11/22/2024        PT/INR:    Lab Results   Component Value Date    PROTIME 11.2 11/22/2024    INR 1.0 11/22/2024       HgBA1c:    Lab Results   Component Value Date    HGBA1C 9.0 (A) 01/16/2025       BMP:  Lab Results   Component Value Date     11/22/2024     04/10/2024     02/07/2022     03/05/2021     (L) 10/02/2020    K 4.1 11/22/2024    K 5.5 (H) 04/10/2024    K 4.4 02/07/2022    K 4.2 03/05/2021    K 4.0 10/02/2020     11/22/2024     04/10/2024     02/07/2022     03/05/2021     10/02/2020    CO2 25 11/22/2024    CO2 28 04/10/2024    CO2 28 02/07/2022    CO2 27 03/05/2021    CO2 23 10/02/2020    BUN 24 (H) 11/22/2024    BUN 20 04/10/2024    BUN 16 02/07/2022    BUN 18 03/05/2021    BUN 17 10/02/2020    CREATININE 1.13 11/22/2024    CREATININE 1.18 04/10/2024    CREATININE 1.15 02/07/2022    CREATININE 1.16 03/05/2021    CREATININE 1.14 10/02/2020       CBC:  Lab Results   Component Value Date    WBC 7.3 11/22/2024    WBC 7.5 04/10/2024    WBC 6.0 02/07/2022    WBC 6.4 03/05/2021    WBC 4.4 10/02/2020    RBC 5.09 11/22/2024    RBC 5.58 04/10/2024    RBC 5.66 02/07/2022    RBC 5.68 03/05/2021    RBC 5.35 10/02/2020    HGB 15.3 11/22/2024    HGB 16.9 04/10/2024    HGB 16.0 02/07/2022    HGB 16.3 03/05/2021    HGB 15.2 10/02/2020    HCT 42.9 11/22/2024    HCT 48.3 04/10/2024    HCT 48.5 02/07/2022    HCT 47.1 03/05/2021    HCT 42.2 10/02/2020    MCV 84 11/22/2024    MCV 87 04/10/2024    MCV 86 02/07/2022    MCV 83 " 03/05/2021    MCV 79 (L) 10/02/2020    MCH 30.1 11/22/2024    MCH 30.3 04/10/2024    MCHC 35.7 11/22/2024    MCHC 35.0 04/10/2024    MCHC 33.0 02/07/2022    MCHC 34.6 03/05/2021    MCHC 36.0 10/02/2020    RDW 11.9 11/22/2024    RDW 11.9 04/10/2024    RDW 12.3 02/07/2022    RDW 12.4 03/05/2021    RDW 12.2 10/02/2020     11/22/2024     04/10/2024     02/07/2022     03/05/2021     (L) 10/02/2020       Cardiac Enzymes:    Lab Results   Component Value Date    TROPHS <3 11/22/2024    TROPHS <3 11/22/2024       Hepatic Function Panel:    Lab Results   Component Value Date    ALKPHOS 85 11/22/2024    ALT 18 11/22/2024    AST 14 11/22/2024    PROT 6.8 11/22/2024    BILITOT 0.6 11/22/2024           I,Conor Medellin LPN   am scribing for, and in the presence of Dr. Sergo Phan DO   .    I, Dr. Sergo Phan DO   , personally performed the services described in the documentation as scribed by Conor Medellin LPN   in my presence, and confirm it is both accurate and complete.      Dr. Sergo Phna DO  Thank you for allowing me to participate in the care of this patient. Please do not hesitate to contact me with any further questions or concerns.

## 2025-04-15 ASSESSMENT — ENCOUNTER SYMPTOMS
POLYDIPSIA: 0
WEIGHT LOSS: 0
SWEATS: 0
TREMORS: 0
VISUAL CHANGE: 0
POLYPHAGIA: 0
HUNGER: 0
SEIZURES: 0
HEADACHES: 0
FATIGUE: 0
WEAKNESS: 0
BLURRED VISION: 0
SPEECH DIFFICULTY: 0
CONFUSION: 0
DIZZINESS: 0
BLACKOUTS: 0
NERVOUS/ANXIOUS: 0

## 2025-04-16 ENCOUNTER — TREATMENT (OUTPATIENT)
Dept: PHYSICAL THERAPY | Facility: CLINIC | Age: 55
End: 2025-04-16
Payer: COMMERCIAL

## 2025-04-16 DIAGNOSIS — M25.552 GREATER TROCHANTERIC PAIN SYNDROME OF BOTH LOWER EXTREMITIES: ICD-10-CM

## 2025-04-16 DIAGNOSIS — M54.50 LUMBAR SPINE PAIN: ICD-10-CM

## 2025-04-16 DIAGNOSIS — M25.551 GREATER TROCHANTERIC PAIN SYNDROME OF BOTH LOWER EXTREMITIES: ICD-10-CM

## 2025-04-16 PROCEDURE — 97110 THERAPEUTIC EXERCISES: CPT | Mod: GP

## 2025-04-16 PROCEDURE — 97112 NEUROMUSCULAR REEDUCATION: CPT | Mod: GP

## 2025-04-16 NOTE — PROGRESS NOTES
"Subjective   Patient ID: Sergo Cordero \"Franko\" is a 54 y.o. male who presents for Annual Exam.    Patient presents in office today for an Annual physical. Last eye exam last year, last dental exam couple years ago. No new family h/o of cancer or heart disease. Does not need paperwork filled out today.     Patient was seen at the ER in 11/2024 for chest pain and was found to have abnormal EKG. However, his chest X-ray was unremarkable. CT angio also showed no chest abnormalities. Echo was also normal. His CT cardiac score 5 years ago was 1.39. He is seen by cardiology Dr. Phan who he recently followed up due to concerns of ventricular hypertrophy.     Patient notes no abdominal pain. He admits to acid reflux once a month.    Patient has recently been diagnosed with moderate obstructive sleep apnea on a home sleep study on 2/13/25.     Patient complaints of a cyst on the the middle of his upper back. Ongoing x for a couple years. Patient states he has popped it before but continues to come back. Patient would like a referral to dermatology.      Review of systems  ; Patient seen today for exam denies any problems with headaches or vision, denies any shortness of breath chest pain nausea or vomiting, no black stool no blood in the stool no heartburn type symptoms denies any problems with constipation or diarrhea, and no dysuria-type symptoms    The patient's allergies medications were reviewed with them today    The patient's social family and surgical history or also reviewed here today, along with her past medical history.     Objective     Alert and active in  no acute distress  HEENT TMs clear oropharynx negative nares clear no drainage noted neck supple  With no adenopathy   Heart regular rate and rhythm without murmur and no carotid bruits  Lungs- clear to auscultation bilaterally, no wheeze or rhonchi noted  Thyroid -negative masses or nodularity  Abdomen- soft times four quadrants , bowel sounds positive " "no masses or organomegaly, negative tenderness guarding or rebound    skin -no lesions noted      /80 (BP Location: Right arm, Patient Position: Sitting, BP Cuff Size: Adult)   Pulse 78   Temp 36.6 °C (97.8 °F) (Temporal)   Ht 1.753 m (5' 9\")   Wt 96.2 kg (212 lb)   SpO2 98%   BMI 31.31 kg/m²     Allergies[1]    Assessment/Plan   Problem List Items Addressed This Visit       Diabetes (Multi)    Diverticulitis, colon    Dyslipidemia    Relevant Orders    CBC and Auto Differential    Comprehensive Metabolic Panel    Controlled type 2 diabetes mellitus without complication, without long-term current use of insulin    Relevant Orders    Albumin-Creatinine Ratio, Urine Random    Hemoglobin A1C    POCT glycosylated hemoglobin (Hb A1C) manually resulted (Completed)     Other Visit Diagnoses         Routine general medical examination at a health care facility    -  Primary      Screening PSA (prostate specific antigen)        Relevant Orders    Prostate Specific Antigen, Screen      Dermoid cyst of skin of back          BMI 31.0-31.9,adult          Class 1 obesity due to excess calories with serious comorbidity and body mass index (BMI) of 31.0 to 31.9 in adult                Patient is overall doing well with the current medication regimen and will continue with the medications.    Labs have been ordered, she/he will have these performed and we will contact her/him with results.  (CBC, CMP, PSA, A1c, Alb Cr)    We discussed removing the cyst on the middle of his back at the next visit and he is agreeable to it.     Follow up at the end of summer or fall for cyst removal.    If anything worsens or changes please call us at once, follow up in the office as planned,       Scribe Attestation  By signing my name below, IKristi Scribe   attest that this documentation has been prepared under the direction and in the presence of Jesús Randhawa DO.    All medical record entries made by the Marilyn were at " my direction and personally dictated by me.   I have reviewed the chart and agree that the record accurately reflects my personal performance of the history, physical exam, discussion, and plan.         [1] No Known Allergies

## 2025-04-16 NOTE — PROGRESS NOTES
"Physical Therapy    Physical Therapy Treatment    Patient Name: Sergo Cordero \"Franko\"  MRN: [id number]  Today's Date: 4/16/2025  Time Calculation  Start Time: 1603  Stop Time: 1647  Time Calculation (min): 44 min        Insurance:  Visit: #3  Authorized: 20  Certification: 3/19/2025 - 3/19/2026   Payor: [employer] / Plan: [employer]  HEALTHCARE / Product Type: *No Product type* /       Subjective:  Patient reports to physical therapy with improvements in symptoms over the last week, not noticing the \"twinging\" sensations as often as previously felt. He notes that the occurrences happen after getting up from a sitting position for an extended period of time.     Patient had recent follow up with cardiologist regarding previous angina but has not had similar symptoms in quite some time. Various tests were conducted and sleep study was positive for sleep apnea which could be contributed to left ventricular hypertrophy.         Current pain: 0/10        Objective:  Lateral hip strength (glute med MMT)   4+/5 bilat     Seated hip internal rotation AROM  L = 50 A  R = (pre-intervention) 35 ; (post-intervention) 45      Treatment:  Therapeutic Exercise = 2 unit  Ellyptical = 3 minutes   Supine hip internal rotation with YTB = 2x12   Fire hydrants (65 beat metronome) = 2x12     Neuromuscular Re-education = 1 units  Supine knees to chest with pilates ring hip add = 2x12   Lateral stepping with SA cable hold = 2x30\" each side (10#)         HEP: #3 (Access Code: [email or web address])  - Supine Hip Internal Rotation   - 1 x daily - 7 x weekly - 2 sets - 15 reps  - Standing Hip Abduction Fire Hydrant  - 1 x daily - 3-4 x weekly - 2 sets - 12 reps  - Supine Double Knee to Chest  - 1 x daily - 3-4 x weekly - 2 sets - 12 reps  - Single-Leg Anti-Rotation Press With Anchored Resistance  - 1 x daily - 3-4 x weekly - 2 sets - 30 seconds hold          Diagnosis:  1. Greater trochanteric pain syndrome of both lower extremities    2. Lumbar spine pain  "         Assessment:   Patient continues to demonstrate positive response to physical therapy, reporting significant reduction in symptom reproduction over the past week. Notably, full AROM into seated hip internal rotation is now pain-free following the introduction of resisted supine hip internal rotation. He progressed from single-leg to double-leg knee-to-chest exercises with hip adduction using a Pilates ring, though noted increased difficulty coordinating diaphragmatic breathing, with a tendency to hold breath. Single-leg Palloff presses were advanced to lateral stepping over hurdles to incorporate reactive and dynamic elements for enhanced trunk and spinal stability. Patient is progressing well and will continue to benefit from skilled physical therapy to optimize functional outcomes.        Plan:  Assess response to HEP - make modifications if necessary   Continue to improve deep abdominal re-education with extremity movement (open and closed chain)   Address proximal hip musculature impairments      Giovanny Powers, PT

## 2025-04-17 ENCOUNTER — APPOINTMENT (OUTPATIENT)
Dept: PRIMARY CARE | Facility: CLINIC | Age: 55
End: 2025-04-17
Payer: COMMERCIAL

## 2025-04-17 VITALS
HEIGHT: 69 IN | TEMPERATURE: 97.8 F | SYSTOLIC BLOOD PRESSURE: 120 MMHG | BODY MASS INDEX: 31.4 KG/M2 | HEART RATE: 78 BPM | DIASTOLIC BLOOD PRESSURE: 80 MMHG | WEIGHT: 212 LBS | OXYGEN SATURATION: 98 %

## 2025-04-17 DIAGNOSIS — E13.69 OTHER SPECIFIED DIABETES MELLITUS WITH OTHER SPECIFIED COMPLICATION, UNSPECIFIED WHETHER LONG TERM INSULIN USE (MULTI): ICD-10-CM

## 2025-04-17 DIAGNOSIS — Z00.00 ROUTINE GENERAL MEDICAL EXAMINATION AT A HEALTH CARE FACILITY: Primary | ICD-10-CM

## 2025-04-17 DIAGNOSIS — E11.9 CONTROLLED TYPE 2 DIABETES MELLITUS WITHOUT COMPLICATION, WITHOUT LONG-TERM CURRENT USE OF INSULIN: ICD-10-CM

## 2025-04-17 DIAGNOSIS — E66.811 CLASS 1 OBESITY DUE TO EXCESS CALORIES WITH SERIOUS COMORBIDITY AND BODY MASS INDEX (BMI) OF 31.0 TO 31.9 IN ADULT: ICD-10-CM

## 2025-04-17 DIAGNOSIS — D23.5 DERMOID CYST OF SKIN OF BACK: ICD-10-CM

## 2025-04-17 DIAGNOSIS — E66.09 CLASS 1 OBESITY DUE TO EXCESS CALORIES WITH SERIOUS COMORBIDITY AND BODY MASS INDEX (BMI) OF 31.0 TO 31.9 IN ADULT: ICD-10-CM

## 2025-04-17 DIAGNOSIS — K57.32 DIVERTICULITIS, COLON: ICD-10-CM

## 2025-04-17 DIAGNOSIS — Z12.5 SCREENING PSA (PROSTATE SPECIFIC ANTIGEN): ICD-10-CM

## 2025-04-17 DIAGNOSIS — E78.5 DYSLIPIDEMIA: ICD-10-CM

## 2025-04-17 LAB — POC HEMOGLOBIN A1C: 7 % (ref 4.2–6.5)

## 2025-04-17 PROCEDURE — 3074F SYST BP LT 130 MM HG: CPT | Performed by: FAMILY MEDICINE

## 2025-04-17 PROCEDURE — 3079F DIAST BP 80-89 MM HG: CPT | Performed by: FAMILY MEDICINE

## 2025-04-17 PROCEDURE — 99396 PREV VISIT EST AGE 40-64: CPT | Performed by: FAMILY MEDICINE

## 2025-04-17 PROCEDURE — 3051F HG A1C>EQUAL 7.0%<8.0%: CPT | Performed by: FAMILY MEDICINE

## 2025-04-17 PROCEDURE — 3008F BODY MASS INDEX DOCD: CPT | Performed by: FAMILY MEDICINE

## 2025-04-17 PROCEDURE — 83036 HEMOGLOBIN GLYCOSYLATED A1C: CPT | Performed by: FAMILY MEDICINE

## 2025-04-21 PROCEDURE — RXMED WILLOW AMBULATORY MEDICATION CHARGE

## 2025-04-23 ENCOUNTER — APPOINTMENT (OUTPATIENT)
Dept: PHYSICAL THERAPY | Facility: CLINIC | Age: 55
End: 2025-04-23
Payer: COMMERCIAL

## 2025-04-23 ENCOUNTER — PHARMACY VISIT (OUTPATIENT)
Dept: PHARMACY | Facility: CLINIC | Age: 55
End: 2025-04-23
Payer: MEDICARE

## 2025-04-24 ENCOUNTER — APPOINTMENT (OUTPATIENT)
Dept: PRIMARY CARE | Facility: CLINIC | Age: 55
End: 2025-04-24
Payer: COMMERCIAL

## 2025-04-25 ENCOUNTER — APPOINTMENT (OUTPATIENT)
Dept: PULMONOLOGY | Facility: CLINIC | Age: 55
End: 2025-04-25
Payer: COMMERCIAL

## 2025-04-30 ENCOUNTER — APPOINTMENT (OUTPATIENT)
Dept: PRIMARY CARE | Facility: CLINIC | Age: 55
End: 2025-04-30
Payer: COMMERCIAL

## 2025-04-30 ENCOUNTER — APPOINTMENT (OUTPATIENT)
Dept: PHYSICAL THERAPY | Facility: CLINIC | Age: 55
End: 2025-04-30
Payer: COMMERCIAL

## 2025-04-30 VITALS
WEIGHT: 210 LBS | HEIGHT: 69 IN | OXYGEN SATURATION: 97 % | SYSTOLIC BLOOD PRESSURE: 122 MMHG | BODY MASS INDEX: 31.1 KG/M2 | TEMPERATURE: 97.8 F | HEART RATE: 86 BPM | DIASTOLIC BLOOD PRESSURE: 78 MMHG

## 2025-04-30 DIAGNOSIS — L72.3 SEBACEOUS CYST: Primary | ICD-10-CM

## 2025-04-30 DIAGNOSIS — E66.09 CLASS 1 OBESITY DUE TO EXCESS CALORIES WITH SERIOUS COMORBIDITY AND BODY MASS INDEX (BMI) OF 31.0 TO 31.9 IN ADULT: ICD-10-CM

## 2025-04-30 DIAGNOSIS — E66.811 CLASS 1 OBESITY DUE TO EXCESS CALORIES WITH SERIOUS COMORBIDITY AND BODY MASS INDEX (BMI) OF 31.0 TO 31.9 IN ADULT: ICD-10-CM

## 2025-04-30 PROCEDURE — 10061 I&D ABSCESS COMP/MULTIPLE: CPT | Performed by: FAMILY MEDICINE

## 2025-04-30 NOTE — PROGRESS NOTES
"Subjective   Patient ID: Sergo Cordero \"Franko\" is a 54 y.o. male who presents for Cyst.  HPI    Patient presents in office for a cyst removal on his left upper back.      Review of systems  ; Patient seen today for exam denies any problems with headaches or vision, denies any shortness of breath chest pain nausea or vomiting, no black stool no blood in the stool no heartburn type symptoms denies any problems with constipation or diarrhea, and no dysuria-type symptoms    The patient's allergies medications were reviewed with them today    The patient's social family and surgical history or also reviewed here today, along with her past medical history.     Objective         skin - On his posterior back, there is an 1 inch x 1 inch sebaceous cyst excised without complicated excision.    Procedure I&D abscess complicated;  the wound was noted as above and cleaned with Betadine and then alcohol, then injected with 2% lidocaine with epi and then incised with a #15 blade, and explored with scissor and hemostat dissection large amount of pus and blood was evacuated, the wound was then irrigated with peroxide packing was then inserted and sterile saline dressing patient tolerated procedure well with no complications      /78 (BP Location: Left arm, Patient Position: Sitting, BP Cuff Size: Adult long)   Pulse 86   Temp 36.6 °C (97.8 °F) (Temporal)   Ht 1.753 m (5' 9\")   Wt 95.3 kg (210 lb)   SpO2 97%   BMI 31.01 kg/m²     Allergies[1]    Assessment/Plan   Problem List Items Addressed This Visit    None  Visit Diagnoses         Sebaceous cyst    -  Primary      BMI 31.0-31.9,adult          Class 1 obesity due to excess calories with serious comorbidity and body mass index (BMI) of 31.0 to 31.9 in adult                The cyst on his left upper back was removed today at the office.     No pathology at this time as sebum came out intact with 95% of cyst sac    If anything worsens or changes please call us at " once, follow up in the office as planned,       Scribe Attestation  By signing my name below, I, Marilyn Awan   attest that this documentation has been prepared under the direction and in the presence of Jesús Randhawa DO.    All medical record entries made by the Scribe were at my direction and personally dictated by me.   I have reviewed the chart and agree that the record accurately reflects my personal performance of the history, physical exam, discussion, and plan.         [1] No Known Allergies

## 2025-05-09 ENCOUNTER — APPOINTMENT (OUTPATIENT)
Dept: PULMONOLOGY | Facility: CLINIC | Age: 55
End: 2025-05-09
Payer: COMMERCIAL

## 2025-05-14 ENCOUNTER — APPOINTMENT (OUTPATIENT)
Dept: PRIMARY CARE | Facility: CLINIC | Age: 55
End: 2025-05-14
Payer: COMMERCIAL

## 2025-05-19 ENCOUNTER — PHARMACY VISIT (OUTPATIENT)
Dept: PHARMACY | Facility: CLINIC | Age: 55
End: 2025-05-19
Payer: MEDICARE

## 2025-05-19 PROCEDURE — RXMED WILLOW AMBULATORY MEDICATION CHARGE

## 2025-06-13 LAB
ALBUMIN SERPL-MCNC: 4.7 G/DL (ref 3.6–5.1)
ALBUMIN/CREAT UR: 3 MG/G CREAT
ALP SERPL-CCNC: 77 U/L (ref 35–144)
ALT SERPL-CCNC: 16 U/L (ref 9–46)
ANION GAP SERPL CALCULATED.4IONS-SCNC: 13 MMOL/L (CALC) (ref 7–17)
AST SERPL-CCNC: 16 U/L (ref 10–35)
BASOPHILS # BLD AUTO: 62 CELLS/UL (ref 0–200)
BASOPHILS NFR BLD AUTO: 0.9 %
BILIRUB SERPL-MCNC: 0.6 MG/DL (ref 0.2–1.2)
BUN SERPL-MCNC: 28 MG/DL (ref 7–25)
CALCIUM SERPL-MCNC: 9.4 MG/DL (ref 8.6–10.3)
CHLORIDE SERPL-SCNC: 102 MMOL/L (ref 98–110)
CO2 SERPL-SCNC: 24 MMOL/L (ref 20–32)
CREAT SERPL-MCNC: 1.13 MG/DL (ref 0.7–1.3)
CREAT UR-MCNC: 88 MG/DL (ref 20–320)
EGFRCR SERPLBLD CKD-EPI 2021: 76 ML/MIN/1.73M2
EOSINOPHIL # BLD AUTO: 117 CELLS/UL (ref 15–500)
EOSINOPHIL NFR BLD AUTO: 1.7 %
ERYTHROCYTE [DISTWIDTH] IN BLOOD BY AUTOMATED COUNT: 11.9 % (ref 11–15)
EST. AVERAGE GLUCOSE BLD GHB EST-MCNC: 154 MG/DL
EST. AVERAGE GLUCOSE BLD GHB EST-SCNC: 8.5 MMOL/L
GLUCOSE SERPL-MCNC: 110 MG/DL (ref 65–99)
HBA1C MFR BLD: 7 %
HCT VFR BLD AUTO: 44.6 % (ref 38.5–50)
HGB BLD-MCNC: 15.2 G/DL (ref 13.2–17.1)
LYMPHOCYTES # BLD AUTO: 2477 CELLS/UL (ref 850–3900)
LYMPHOCYTES NFR BLD AUTO: 35.9 %
MCH RBC QN AUTO: 30.2 PG (ref 27–33)
MCHC RBC AUTO-ENTMCNC: 34.1 G/DL (ref 32–36)
MCV RBC AUTO: 88.7 FL (ref 80–100)
MICROALBUMIN UR-MCNC: 0.3 MG/DL
MONOCYTES # BLD AUTO: 690 CELLS/UL (ref 200–950)
MONOCYTES NFR BLD AUTO: 10 %
NEUTROPHILS # BLD AUTO: 3554 CELLS/UL (ref 1500–7800)
NEUTROPHILS NFR BLD AUTO: 51.5 %
PLATELET # BLD AUTO: 183 THOUSAND/UL (ref 140–400)
PMV BLD REES-ECKER: 9.9 FL (ref 7.5–12.5)
POTASSIUM SERPL-SCNC: 4.6 MMOL/L (ref 3.5–5.3)
PROT SERPL-MCNC: 6.4 G/DL (ref 6.1–8.1)
PSA SERPL-MCNC: 0.32 NG/ML
RBC # BLD AUTO: 5.03 MILLION/UL (ref 4.2–5.8)
SODIUM SERPL-SCNC: 139 MMOL/L (ref 135–146)
WBC # BLD AUTO: 6.9 THOUSAND/UL (ref 3.8–10.8)

## 2025-06-18 ENCOUNTER — APPOINTMENT (OUTPATIENT)
Dept: PULMONOLOGY | Facility: CLINIC | Age: 55
End: 2025-06-18
Payer: COMMERCIAL

## 2025-06-18 VITALS
SYSTOLIC BLOOD PRESSURE: 135 MMHG | DIASTOLIC BLOOD PRESSURE: 80 MMHG | OXYGEN SATURATION: 97 % | TEMPERATURE: 96.9 F | HEART RATE: 92 BPM | WEIGHT: 214 LBS | BODY MASS INDEX: 31.7 KG/M2 | HEIGHT: 69 IN

## 2025-06-18 DIAGNOSIS — G47.33 OSA (OBSTRUCTIVE SLEEP APNEA): Primary | ICD-10-CM

## 2025-06-18 PROCEDURE — 3051F HG A1C>EQUAL 7.0%<8.0%: CPT | Performed by: STUDENT IN AN ORGANIZED HEALTH CARE EDUCATION/TRAINING PROGRAM

## 2025-06-18 PROCEDURE — 99204 OFFICE O/P NEW MOD 45 MIN: CPT | Performed by: STUDENT IN AN ORGANIZED HEALTH CARE EDUCATION/TRAINING PROGRAM

## 2025-06-18 PROCEDURE — 3079F DIAST BP 80-89 MM HG: CPT | Performed by: STUDENT IN AN ORGANIZED HEALTH CARE EDUCATION/TRAINING PROGRAM

## 2025-06-18 PROCEDURE — 1036F TOBACCO NON-USER: CPT | Performed by: STUDENT IN AN ORGANIZED HEALTH CARE EDUCATION/TRAINING PROGRAM

## 2025-06-18 PROCEDURE — 3008F BODY MASS INDEX DOCD: CPT | Performed by: STUDENT IN AN ORGANIZED HEALTH CARE EDUCATION/TRAINING PROGRAM

## 2025-06-18 PROCEDURE — 3075F SYST BP GE 130 - 139MM HG: CPT | Performed by: STUDENT IN AN ORGANIZED HEALTH CARE EDUCATION/TRAINING PROGRAM

## 2025-06-18 PROCEDURE — 99212 OFFICE O/P EST SF 10 MIN: CPT | Performed by: STUDENT IN AN ORGANIZED HEALTH CARE EDUCATION/TRAINING PROGRAM

## 2025-06-18 PROCEDURE — RXMED WILLOW AMBULATORY MEDICATION CHARGE

## 2025-06-18 ASSESSMENT — ENCOUNTER SYMPTOMS
SPEECH DIFFICULTY: 0
SLEEP DISTURBANCE: 0
HEADACHES: 0
DIFFICULTY URINATING: 0
DIZZINESS: 0
CONSTIPATION: 0
JOINT SWELLING: 0
DIARRHEA: 0
FEVER: 0
BLOOD IN STOOL: 0
UNEXPECTED WEIGHT CHANGE: 0
COUGH: 0
TROUBLE SWALLOWING: 0
NAUSEA: 0
CHILLS: 0
COLOR CHANGE: 0
ABDOMINAL DISTENTION: 0
WHEEZING: 0
SHORTNESS OF BREATH: 0
LIGHT-HEADEDNESS: 0
ABDOMINAL PAIN: 0
VOMITING: 0
ARTHRALGIAS: 0
CONFUSION: 0

## 2025-06-18 NOTE — PROGRESS NOTES
Department of Medicine  Division of Pulmonary, Critical Care, and Sleep Medicine  Consultation  [facility]    I was asked by No ref. provider found, to evaluate Sergo Cordero for obstructive sleep apnea. I have independently interviewed and examined the patient in the office and reviewed available records. The finalized note is available in the electronic medical record for review by the referring physician.     Physician HPI (6/18/2025):  Patient is a 54-year-old  male presenting to the clinic today for evaluation of obstructive sleep apnea.  Patient story begins earlier this year when he was experiencing some chest pain and fatigue symptoms.  He reached out to his primary care physician who had a cardiac workup.  His echocardiogram showed evidence of hypertrophic cardiomyopathy otherwise relatively normal-appearing heart function.  He then proceeded to get a home sleep study performed which was positive,   CONRADO 3% 21.2, CONRADO 4% 14.9, ralf SpO2 85%, time with oxygen less than 89% 8 minutes.  Patient had never formally been diagnosed as having obstructive sleep apnea but reports that his brother does have obstructive sleep apnea.  Patient otherwise has no respiratory complaints.  He is a never smoker.  Patient is a former EMT.    Immunization History:  Immunization History   Administered Date(s) Administered    Flu vaccine (IIV4), preservative free *Check age/dose* 10/02/2023    Kenneth SARS-CoV-2 Vaccination 03/24/2021       Family History:  Family History[1]    Social History:  Social History[2]    Current Medications:  Current Outpatient Medications   Medication Instructions    aspirin 81 mg EC tablet 1 tablet, Daily    atorvastatin (LIPITOR) 20 mg, oral, Daily    cyclobenzaprine (Flexeril) 10 mg tablet TAKE 1/2 TABLET TO 1 TABLET NIGHTLY AS NEEDED FOR BACK PAIN AT BEDTIME.    Dexcom G7  misc USE AS INSTRUCTED    Dexcom G7 Sensor device USE AS INSTRUCTED, REPLACE EVERY 10  "DAYS    empagliflozin-metformin (Synjardy) 12.5-500 mg 1 tablet, oral, 2 times daily    multivitamin tablet 1 tablet, Daily    sildenafil (Viagra) 50 mg tablet Take by mouth.    Trulicity 3 mg, subcutaneous, Once Weekly        Drug Allergies/Intolerances:  RX Allergies[3]     Review of Systems:  Review of Systems   Constitutional:  Negative for chills, fever and unexpected weight change.   HENT:  Negative for congestion and trouble swallowing.    Respiratory:  Negative for cough, shortness of breath and wheezing.    Cardiovascular:  Negative for chest pain.   Gastrointestinal:  Negative for abdominal distention, abdominal pain, blood in stool, constipation, diarrhea, nausea and vomiting.   Genitourinary:  Negative for difficulty urinating.   Musculoskeletal:  Negative for arthralgias and joint swelling.   Skin:  Negative for color change.   Neurological:  Negative for dizziness, speech difficulty, light-headedness and headaches.   Psychiatric/Behavioral:  Negative for confusion and sleep disturbance.         All other review of systems are negative and/or non-contributory.    Physical Examination:  /80   Pulse 92   Temp 36.1 °C (96.9 °F)   Ht 1.753 m (5' 9\")   Wt 97.1 kg (214 lb)   SpO2 97%   BMI 31.60 kg/m²      Measured SpO2 is with ambient air at rest    Physical Exam  Constitutional:       General: He is awake.      Appearance: Normal appearance.   HENT:      Head: Normocephalic and atraumatic.      Nose: Nose normal.      Mouth/Throat:      Mouth: Mucous membranes are moist.      Comments: Mallampati class 3  Eyes:      Pupils: Pupils are equal, round, and reactive to light.   Neck:      Thyroid: No thyroid mass.      Trachea: Phonation normal.   Cardiovascular:      Rate and Rhythm: Normal rate and regular rhythm.   Pulmonary:      Effort: Pulmonary effort is normal. No respiratory distress.      Breath sounds: Normal air entry. No decreased breath sounds, wheezing, rhonchi or rales.   Abdominal: "      General: Bowel sounds are normal. There is no distension.      Palpations: Abdomen is soft.      Tenderness: There is no abdominal tenderness.   Musculoskeletal:      Cervical back: Neck supple.      Right lower leg: No edema.      Left lower leg: No edema.   Skin:     General: Skin is warm.      Capillary Refill: Capillary refill takes less than 2 seconds.   Neurological:      General: No focal deficit present.      Mental Status: He is alert and oriented to person, place, and time. Mental status is at baseline.      Cranial Nerves: Cranial nerves 2-12 are intact.      Motor: Motor function is intact.   Psychiatric:         Attention and Perception: Attention and perception normal.         Mood and Affect: Mood normal.         Speech: Speech normal.         Behavior: Behavior normal.         Pulmonary Function Test Results     Failed to redirect to the Timeline version of the [employer] SmartLink.      Chest Radiograph     XR chest 1 view 11/22/2024    Narrative  STUDY:  Chest Radiograph;  11/22/2024 6:33 PM  INDICATION:  Chest pain.  COMPARISON:  10/02/2020 XR Chest.  ACCESSION NUMBER(S):  [id number]  ORDERING CLINICIAN:  ROLLY KENYON  TECHNIQUE:  Frontal chest was obtained at 18:33 hours.  FINDINGS:  CARDIOMEDIASTINAL SILHOUETTE:  Cardiomediastinal silhouette is normal in size and configuration.    LUNGS:  Lungs are clear.  There is no pneumothorax or pleural effusion.    ABDOMEN:  No remarkable upper abdominal findings.    BONES:  No acute osseous changes.    Impression  No acute cardiopulmonary disease.  Signed by Candido Talavera DO      Echocardiogram     No results found for this or any previous visit from the past 365 days.       Chest CT Scan     No results found for this or any previous visit from the past 365 days.         Relevant Laboratory Tests       Assessment and Plan / Recommendations:  Problem List Items Addressed This Visit          Sleep    ALECIA (obstructive sleep apnea) - Primary     Relevant Orders    Positive Airway Pressure (PAP) Therapy  Patient has moderate obstructive sleep apnea confirmed on home sleep study.  We will start the patient on auto titrating CPAP therapy 5-15 cm H2O and have him return to the clinic in 6-8 weeks  I did tell the patient that with weight loss he may be able to reduce the severity of obstructive sleep apnea to where he does not require CPAP  Patient was also encouraged to use proper sleep hygiene habits        Follow-up: 6-8 weeks    Parts of this note may have been generated using voice dictation software, Dragon.  Homophonic errors may exist.  Please contact me directly if clarification is needed.    Timi Bejarano MD, MSBS   Pulmonary/Critical Care  3:17 PM  06/18/25  [address] [address]  Office: [phone]  Fax: [phone]         [1]   Family History  Problem Relation Name Age of Onset    Heart attack Mother Pauline     Heart disease Mother Pauline     Other (enlarged heart) Mother Pauline     Pulmonary fibrosis Mother Pauline    [2]   Social History  Socioeconomic History    Marital status:    Tobacco Use    Smoking status: Never    Smokeless tobacco: Never   Vaping Use    Vaping status: Never Used   Substance and Sexual Activity    Alcohol use: Yes     Alcohol/week: 10.0 standard drinks of alcohol     Types: 5 Cans of beer, 5 Shots of liquor per week     Comment: social    Drug use: Never    Sexual activity: Yes     Partners: Female     Birth control/protection: Male Sterilization   [3] No Known Allergies

## 2025-06-19 ENCOUNTER — PHARMACY VISIT (OUTPATIENT)
Dept: PHARMACY | Facility: CLINIC | Age: 55
End: 2025-06-19
Payer: MEDICARE

## 2025-06-19 ENCOUNTER — TELEPHONE (OUTPATIENT)
Dept: PRIMARY CARE | Facility: CLINIC | Age: 55
End: 2025-06-19
Payer: COMMERCIAL

## 2025-06-19 NOTE — TELEPHONE ENCOUNTER
----- Message from Jesús Randhawa sent at 6/19/2025  8:43 AM EDT -----  Labs are all very stable including A1c see us back in the office before the holidays  ----- Message -----  From: Kayla Fall MA  Sent: 4/17/2025   4:29 PM EDT  To: Jesús Randhawa DO

## 2025-07-07 DIAGNOSIS — E11.9 TYPE 2 DIABETES MELLITUS WITHOUT COMPLICATIONS: ICD-10-CM

## 2025-07-07 RX ORDER — BLOOD-GLUCOSE SENSOR
EACH MISCELLANEOUS
Qty: 4 EACH | Refills: 2 | Status: SHIPPED | OUTPATIENT
Start: 2025-07-07

## 2025-07-10 PROCEDURE — RXMED WILLOW AMBULATORY MEDICATION CHARGE

## 2025-07-11 ENCOUNTER — PHARMACY VISIT (OUTPATIENT)
Dept: PHARMACY | Facility: CLINIC | Age: 55
End: 2025-07-11
Payer: MEDICARE

## 2025-07-13 ASSESSMENT — ENCOUNTER SYMPTOMS
TREMORS: 0
POLYPHAGIA: 0
HEADACHES: 0
BLACKOUTS: 0
POLYDIPSIA: 0
SWEATS: 0
SPEECH DIFFICULTY: 0
NERVOUS/ANXIOUS: 0
VISUAL CHANGE: 1
DIZZINESS: 0
BLURRED VISION: 1
FATIGUE: 0
WEIGHT LOSS: 0
WEAKNESS: 0
SEIZURES: 0
HUNGER: 0
CONFUSION: 0

## 2025-07-14 NOTE — PROGRESS NOTES
"Subjective   Patient ID: Sergo Cordero \"Franko\" is a 54 y.o. male who presents for Diabetes.  Diabetes  He has type 2 diabetes mellitus. No MedicAlert identification noted. The initial diagnosis of diabetes was made 3 years ago. Pertinent negatives for hypoglycemia include no confusion, dizziness, headaches, hunger, mood changes, nervousness/anxiousness, pallor, seizures, sleepiness, speech difficulty, sweats or tremors. Associated symptoms include blurred vision and visual change. Pertinent negatives for diabetes include no chest pain, no fatigue, no foot paresthesias, no foot ulcerations, no polydipsia, no polyphagia, no polyuria, no weakness and no weight loss. Pertinent negatives for hypoglycemia complications include no blackouts, no hospitalization, no nocturnal hypoglycemia, no required assistance and no required glucagon injection. Symptoms are improving. Diabetic complications include impotence. Pertinent negatives for diabetic complications include no CVA, heart disease, nephropathy, peripheral neuropathy, PVD or retinopathy. Risk factors for coronary artery disease include family history, obesity and sedentary lifestyle. Current diabetic treatment includes oral agent (dual therapy). He is compliant with treatment all of the time. His weight is decreasing steadily. He is following a generally unhealthy diet. When asked about meal planning, he reported none. He has not had a previous visit with a dietitian. He participates in exercise three times a week. He monitors blood glucose at home 5+ x per day. He monitors urine at home <1 x per month. His home blood glucose trend is fluctuating minimally. His breakfast blood glucose is taken between 6-7 am. His breakfast blood glucose range is generally 130-140 mg/dl. His lunch blood glucose is taken between 12-1 pm. His lunch blood glucose range is generally 140-180 mg/dl. His dinner blood glucose is taken between 5-6 pm. His dinner blood glucose range is " generally 140-180 mg/dl. His bedtime blood glucose is taken between 9-10 pm. His bedtime blood glucose range is generally 130-140 mg/dl. His overall blood glucose range is 130-140 mg/dl. He does not see a podiatrist.Eye exam is current.     DM  Does check glucose at home. Today glucose was 140. Eats a generally healthy diet, Exercises. Currently taking Synjardy 500 mg and Trulicity. Last A1c on 06/12/25 @ 7.0 %. Last eye exam last year. Has an upcoming appointment scheduled with his Optometrist. Does not see a Podiatrist.     Reports he has been doing well overall. Recently returned from a cruise vacation to the [facility]. States he walked approximately 20-30,000 steps while on vacation which he enjoyed. He has a CPAP now for his ALECIA, and has remained compliant, no issues. No longer snoring, and feels the quality of his sleep has improved. Denies nocturia.     CBC (06/12/25): No anemia or leukocytosis.   Renal function appears stable; GFR 76, Cr 1.13 (06/12/25)  LFT's within normal limits. (06/12/25)  PSA: 0.32 (wnl, June 2024)  Lipid panel: Not yet performed.     Review of systems  ; Patient seen today for exam denies any problems with headaches or vision, denies any shortness of breath chest pain nausea or vomiting, no black stool no blood in the stool no heartburn type symptoms denies any problems with constipation or diarrhea, and no dysuria-type symptoms    The patient's allergies medications were reviewed with them today.  States he had obtained his shingles vaccine in the interim.   Believes he may be due for a tetanus booster.   Denies any puncture wounds, or tetanus concerns.     The patient's social family and surgical history or also reviewed here today, along with her past medical history.     Objective     Alert and active in  no acute distress  HEENT TMs clear oropharynx negative nares clear no drainage noted neck supple  With no adenopathy   Heart regular rate and rhythm without murmur and no  carotid bruits  Lungs- clear to auscultation bilaterally, no wheeze or rhonchi noted  Thyroid -negative masses or nodularity  Abdomen- soft times four quadrants , bowel sounds positive no masses or organomegaly, negative tenderness guarding or rebound    skin -no lesions noted    There were no vitals taken for this visit.    Assessment/Plan   Problem List Items Addressed This Visit    None  Visit Diagnoses         Immunization due              #T2DM ~ A1c: 7.0% (06/12/25)  #HLD  Advised to repeat a lipid panel as well as an A1c.   Schedule follow up in 6 months to review results.     #Tetanus vaccination  Tetanus booster provided in-office today. (07/15/25)    #ALECIA  He started CPAP use in the interim. Endorses benefit, no longer snoring and quality of his sleep has improved. Encouraged him to remain CPAP compliant as this will reduce the frequency of his apneic episodes and hypopneas as well as his risk of heart disease. This may improve his insulin sensitivity and glucose metabolism as well.     If anything worsens or changes please call us at once, follow up in the office as planned,     Scribed for Dr. Jesús Randhawa by Onel Levin.   By signing my name below, I, Marilyn Morgan attest that this documentation has been prepared under the direction and in the presence of Dr. Jesús Randhawa, . All medical record entries made by the Marilyn were at my direction or personally dictated by me. I have reviewed the chart and agree that the record accurately reflects my personal performance of the history, physical exam, discussion and plan. 07/15/25    All medical record entries made by the [place] were at my direction and personally dictated by me. I have reviewed the chart and agree that the record accurately reflects my personal performance of the history, physical exam, discussion, and plan.

## 2025-07-15 ENCOUNTER — APPOINTMENT (OUTPATIENT)
Dept: PRIMARY CARE | Facility: CLINIC | Age: 55
End: 2025-07-15
Payer: COMMERCIAL

## 2025-07-15 VITALS
WEIGHT: 212 LBS | HEART RATE: 80 BPM | TEMPERATURE: 97.8 F | BODY MASS INDEX: 31.4 KG/M2 | SYSTOLIC BLOOD PRESSURE: 110 MMHG | DIASTOLIC BLOOD PRESSURE: 80 MMHG | HEIGHT: 69 IN | OXYGEN SATURATION: 99 %

## 2025-07-15 DIAGNOSIS — E66.09 CLASS 1 OBESITY DUE TO EXCESS CALORIES WITH SERIOUS COMORBIDITY AND BODY MASS INDEX (BMI) OF 31.0 TO 31.9 IN ADULT: ICD-10-CM

## 2025-07-15 DIAGNOSIS — Z23 IMMUNIZATION DUE: ICD-10-CM

## 2025-07-15 DIAGNOSIS — E11.9 CONTROLLED TYPE 2 DIABETES MELLITUS WITHOUT COMPLICATION, WITHOUT LONG-TERM CURRENT USE OF INSULIN: Primary | ICD-10-CM

## 2025-07-15 DIAGNOSIS — G47.33 OSA (OBSTRUCTIVE SLEEP APNEA): ICD-10-CM

## 2025-07-15 DIAGNOSIS — E66.811 CLASS 1 OBESITY DUE TO EXCESS CALORIES WITH SERIOUS COMORBIDITY AND BODY MASS INDEX (BMI) OF 31.0 TO 31.9 IN ADULT: ICD-10-CM

## 2025-07-15 DIAGNOSIS — E78.5 DYSLIPIDEMIA: ICD-10-CM

## 2025-07-15 PROCEDURE — 3008F BODY MASS INDEX DOCD: CPT | Performed by: FAMILY MEDICINE

## 2025-07-15 PROCEDURE — 1036F TOBACCO NON-USER: CPT | Performed by: FAMILY MEDICINE

## 2025-07-15 PROCEDURE — 90471 IMMUNIZATION ADMIN: CPT | Performed by: FAMILY MEDICINE

## 2025-07-15 PROCEDURE — 3074F SYST BP LT 130 MM HG: CPT | Performed by: FAMILY MEDICINE

## 2025-07-15 PROCEDURE — 3051F HG A1C>EQUAL 7.0%<8.0%: CPT | Performed by: FAMILY MEDICINE

## 2025-07-15 PROCEDURE — 90715 TDAP VACCINE 7 YRS/> IM: CPT | Performed by: FAMILY MEDICINE

## 2025-07-15 PROCEDURE — 3079F DIAST BP 80-89 MM HG: CPT | Performed by: FAMILY MEDICINE

## 2025-07-15 PROCEDURE — 99213 OFFICE O/P EST LOW 20 MIN: CPT | Performed by: FAMILY MEDICINE

## 2025-07-15 ASSESSMENT — ENCOUNTER SYMPTOMS
HUNGER: 0
NERVOUS/ANXIOUS: 0
WEAKNESS: 0
TREMORS: 0
SEIZURES: 0
BLACKOUTS: 0
FATIGUE: 0
POLYDIPSIA: 0
CONFUSION: 0
SWEATS: 0
SPEECH DIFFICULTY: 0
HEADACHES: 0
BLURRED VISION: 1
VISUAL CHANGE: 1
POLYPHAGIA: 0
WEIGHT LOSS: 0
DIZZINESS: 0

## 2025-07-25 DIAGNOSIS — E11.9 TYPE 2 DIABETES MELLITUS WITHOUT COMPLICATIONS: ICD-10-CM

## 2025-07-25 RX ORDER — EMPAGLIFLOZIN AND METFORMIN HYDROCHLORIDE 12.5; 5 MG/1; MG/1
1 TABLET ORAL 2 TIMES DAILY
Qty: 180 TABLET | Refills: 1 | Status: SHIPPED | OUTPATIENT
Start: 2025-07-25

## 2025-07-28 ENCOUNTER — PATIENT MESSAGE (OUTPATIENT)
Dept: PRIMARY CARE | Facility: CLINIC | Age: 55
End: 2025-07-28
Payer: COMMERCIAL

## 2025-07-28 DIAGNOSIS — N52.01 ERECTILE DYSFUNCTION DUE TO ARTERIAL INSUFFICIENCY: Primary | ICD-10-CM

## 2025-07-29 NOTE — TELEPHONE ENCOUNTER
Recent Visits  Date Type Provider Dept   07/15/25 Office Visit Jesús Randhawa, DO Do [id number] [facility]   04/30/25 Procedure Visit Jesús Randhawa, DO Do [id number] [facility]   04/17/25 Office Visit Jesús Randhawa, DO Do [id number] [facility]   Showing recent visits within past 180 days and meeting all other requirements  Future Appointments  No visits were found meeting these conditions.  Showing future appointments within next 90 days and meeting all other requirements

## 2025-07-30 RX ORDER — SILDENAFIL 100 MG/1
100 TABLET, FILM COATED ORAL DAILY PRN
Qty: 30 TABLET | Refills: 3 | Status: SHIPPED | OUTPATIENT
Start: 2025-07-30 | End: 2026-07-30

## 2025-08-22 DIAGNOSIS — E78.5 DYSLIPIDEMIA: ICD-10-CM

## 2025-08-22 DIAGNOSIS — E11.9 TYPE 2 DIABETES MELLITUS WITHOUT COMPLICATION, WITHOUT LONG-TERM CURRENT USE OF INSULIN: ICD-10-CM

## 2025-08-22 RX ORDER — ATORVASTATIN CALCIUM 20 MG/1
20 TABLET, FILM COATED ORAL DAILY
Qty: 90 TABLET | Refills: 1 | Status: SHIPPED | OUTPATIENT
Start: 2025-08-22

## 2025-08-29 ENCOUNTER — OFFICE VISIT (OUTPATIENT)
Dept: PULMONOLOGY | Facility: CLINIC | Age: 55
End: 2025-08-29
Payer: COMMERCIAL

## 2025-08-29 VITALS
HEART RATE: 88 BPM | RESPIRATION RATE: 16 BRPM | BODY MASS INDEX: 30.81 KG/M2 | DIASTOLIC BLOOD PRESSURE: 82 MMHG | HEIGHT: 69 IN | WEIGHT: 208 LBS | TEMPERATURE: 98.2 F | SYSTOLIC BLOOD PRESSURE: 123 MMHG | OXYGEN SATURATION: 96 %

## 2025-08-29 DIAGNOSIS — G47.33 OSA (OBSTRUCTIVE SLEEP APNEA): Primary | ICD-10-CM

## 2025-08-29 PROCEDURE — 99212 OFFICE O/P EST SF 10 MIN: CPT | Performed by: STUDENT IN AN ORGANIZED HEALTH CARE EDUCATION/TRAINING PROGRAM

## 2025-08-29 PROCEDURE — 99213 OFFICE O/P EST LOW 20 MIN: CPT | Performed by: STUDENT IN AN ORGANIZED HEALTH CARE EDUCATION/TRAINING PROGRAM

## 2025-08-29 PROCEDURE — 3051F HG A1C>EQUAL 7.0%<8.0%: CPT | Performed by: STUDENT IN AN ORGANIZED HEALTH CARE EDUCATION/TRAINING PROGRAM

## 2025-08-29 PROCEDURE — 3074F SYST BP LT 130 MM HG: CPT | Performed by: STUDENT IN AN ORGANIZED HEALTH CARE EDUCATION/TRAINING PROGRAM

## 2025-08-29 PROCEDURE — 3079F DIAST BP 80-89 MM HG: CPT | Performed by: STUDENT IN AN ORGANIZED HEALTH CARE EDUCATION/TRAINING PROGRAM

## 2025-08-29 PROCEDURE — 1036F TOBACCO NON-USER: CPT | Performed by: STUDENT IN AN ORGANIZED HEALTH CARE EDUCATION/TRAINING PROGRAM

## 2025-08-29 PROCEDURE — 3008F BODY MASS INDEX DOCD: CPT | Performed by: STUDENT IN AN ORGANIZED HEALTH CARE EDUCATION/TRAINING PROGRAM

## 2025-08-29 ASSESSMENT — ENCOUNTER SYMPTOMS
UNEXPECTED WEIGHT CHANGE: 0
SPEECH DIFFICULTY: 0
TROUBLE SWALLOWING: 0
SLEEP DISTURBANCE: 0
ABDOMINAL DISTENTION: 0
HEADACHES: 0
CONSTIPATION: 0
SHORTNESS OF BREATH: 0
DIZZINESS: 0
JOINT SWELLING: 0
COUGH: 0
NAUSEA: 0
VOMITING: 0
FEVER: 0
WHEEZING: 0
CHILLS: 0
DIFFICULTY URINATING: 0
CONFUSION: 0
COLOR CHANGE: 0
DIARRHEA: 0
ARTHRALGIAS: 0
LIGHT-HEADEDNESS: 0
ABDOMINAL PAIN: 0
BLOOD IN STOOL: 0

## 2025-09-02 PROCEDURE — RXMED WILLOW AMBULATORY MEDICATION CHARGE

## 2025-09-03 ENCOUNTER — PHARMACY VISIT (OUTPATIENT)
Dept: PHARMACY | Facility: CLINIC | Age: 55
End: 2025-09-03
Payer: MEDICARE

## 2025-09-04 ENCOUNTER — APPOINTMENT (OUTPATIENT)
Dept: PRIMARY CARE | Facility: CLINIC | Age: 55
End: 2025-09-04
Payer: COMMERCIAL

## 2026-01-14 ENCOUNTER — APPOINTMENT (OUTPATIENT)
Dept: PRIMARY CARE | Facility: CLINIC | Age: 56
End: 2026-01-14
Payer: COMMERCIAL

## 2026-04-09 ENCOUNTER — APPOINTMENT (OUTPATIENT)
Dept: CARDIOLOGY | Facility: CLINIC | Age: 56
End: 2026-04-09
Payer: COMMERCIAL

## 2026-08-28 ENCOUNTER — APPOINTMENT (OUTPATIENT)
Dept: PULMONOLOGY | Facility: CLINIC | Age: 56
End: 2026-08-28
Payer: COMMERCIAL